# Patient Record
Sex: FEMALE | Race: WHITE | HISPANIC OR LATINO | URBAN - METROPOLITAN AREA
[De-identification: names, ages, dates, MRNs, and addresses within clinical notes are randomized per-mention and may not be internally consistent; named-entity substitution may affect disease eponyms.]

---

## 2017-05-19 ENCOUNTER — EMERGENCY (EMERGENCY)
Facility: HOSPITAL | Age: 37
LOS: 1 days | Discharge: HOME | End: 2017-05-19
Attending: EMERGENCY MEDICINE
Payer: COMMERCIAL

## 2017-05-19 VITALS
DIASTOLIC BLOOD PRESSURE: 66 MMHG | SYSTOLIC BLOOD PRESSURE: 114 MMHG | OXYGEN SATURATION: 99 % | RESPIRATION RATE: 19 BRPM | HEART RATE: 64 BPM | WEIGHT: 199.96 LBS | HEIGHT: 66 IN | TEMPERATURE: 98 F

## 2017-05-19 DIAGNOSIS — M50.222 OTHER CERVICAL DISC DISPLACEMENT AT C5-C6 LEVEL: ICD-10-CM
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
## 2017-05-19 DIAGNOSIS — M48.02 SPINAL STENOSIS, CERVICAL REGION: ICD-10-CM

## 2017-05-19 DIAGNOSIS — M25.512 PAIN IN LEFT SHOULDER: ICD-10-CM

## 2017-05-19 DIAGNOSIS — R55 SYNCOPE AND COLLAPSE: ICD-10-CM
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
## 2017-05-19 DIAGNOSIS — R42 DIZZINESS AND GIDDINESS: ICD-10-CM

## 2017-05-19 DIAGNOSIS — R11.0 NAUSEA: ICD-10-CM

## 2017-05-19 PROCEDURE — 99284 EMERGENCY DEPT VISIT MOD MDM: CPT | Mod: 25

## 2017-05-19 PROCEDURE — 71046 X-RAY EXAM CHEST 2 VIEWS: CPT

## 2017-05-19 PROCEDURE — 72125 CT NECK SPINE W/O DYE: CPT | Mod: 26

## 2017-05-19 PROCEDURE — 99285 EMERGENCY DEPT VISIT HI MDM: CPT | Mod: 25

## 2017-05-19 PROCEDURE — 73030 X-RAY EXAM OF SHOULDER: CPT

## 2017-05-19 PROCEDURE — 93005 ELECTROCARDIOGRAM TRACING: CPT

## 2017-05-19 PROCEDURE — 73030 X-RAY EXAM OF SHOULDER: CPT | Mod: 26,LT

## 2017-05-19 PROCEDURE — 72125 CT NECK SPINE W/O DYE: CPT

## 2017-05-19 PROCEDURE — 71020: CPT | Mod: 26

## 2017-05-19 PROCEDURE — 70450 CT HEAD/BRAIN W/O DYE: CPT | Mod: 26

## 2017-05-19 PROCEDURE — 70450 CT HEAD/BRAIN W/O DYE: CPT

## 2017-05-19 RX ORDER — ACETAMINOPHEN 500 MG
975 TABLET ORAL ONCE
Qty: 0 | Refills: 0 | Status: COMPLETED | OUTPATIENT
Start: 2017-05-19 | End: 2017-05-19

## 2017-05-19 RX ADMIN — Medication 975 MILLIGRAM(S): at 23:58
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
## 2017-05-19 NOTE — ED PROVIDER NOTE - CONDUCTED A DETAILED DISCUSSION WITH PATIENT AND/OR GUARDIAN REGARDING, MDM
return to ED if symptoms worsen, persist or questions arise lab results/radiology results/return to ED if symptoms worsen, persist or questions arise
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
## 2017-05-19 NOTE — ED PROVIDER NOTE - NS ED MD SCRIBE ATTENDING SCRIBE SECTIONS
PHYSICAL EXAM/HIV/REVIEW OF SYSTEMS/DISPOSITION/PAST MEDICAL/SURGICAL/SOCIAL HISTORY/HISTORY OF PRESENT ILLNESS/VITAL SIGNS( Pullset)
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
## 2017-05-20 VITALS
TEMPERATURE: 98 F | HEART RATE: 51 BPM | OXYGEN SATURATION: 100 % | SYSTOLIC BLOOD PRESSURE: 107 MMHG | DIASTOLIC BLOOD PRESSURE: 75 MMHG | RESPIRATION RATE: 18 BRPM

## 2017-06-08 ENCOUNTER — APPOINTMENT (OUTPATIENT)
Dept: SPINE | Facility: CLINIC | Age: 37
End: 2017-06-08

## 2017-06-08 VITALS
SYSTOLIC BLOOD PRESSURE: 122 MMHG | BODY MASS INDEX: 33.11 KG/M2 | DIASTOLIC BLOOD PRESSURE: 76 MMHG | HEIGHT: 66 IN | WEIGHT: 206 LBS

## 2017-06-08 DIAGNOSIS — G95.9 DISEASE OF SPINAL CORD, UNSPECIFIED: ICD-10-CM

## 2017-07-21 ENCOUNTER — OUTPATIENT (OUTPATIENT)
Dept: OUTPATIENT SERVICES | Facility: HOSPITAL | Age: 37
LOS: 1 days | End: 2017-07-21
Payer: COMMERCIAL

## 2017-07-21 VITALS
OXYGEN SATURATION: 99 % | HEIGHT: 66 IN | TEMPERATURE: 98 F | SYSTOLIC BLOOD PRESSURE: 120 MMHG | DIASTOLIC BLOOD PRESSURE: 70 MMHG | HEART RATE: 80 BPM | WEIGHT: 203.05 LBS | RESPIRATION RATE: 16 BRPM

## 2017-07-21 DIAGNOSIS — M50.00 CERVICAL DISC DISORDER WITH MYELOPATHY, UNSPECIFIED CERVICAL REGION: ICD-10-CM

## 2017-07-21 DIAGNOSIS — G95.9 DISEASE OF SPINAL CORD, UNSPECIFIED: ICD-10-CM

## 2017-07-21 DIAGNOSIS — Z98.890 OTHER SPECIFIED POSTPROCEDURAL STATES: Chronic | ICD-10-CM

## 2017-07-21 DIAGNOSIS — Z01.818 ENCOUNTER FOR OTHER PREPROCEDURAL EXAMINATION: ICD-10-CM

## 2017-07-21 LAB
ANION GAP SERPL CALC-SCNC: 17 MMOL/L — SIGNIFICANT CHANGE UP (ref 5–17)
BLD GP AB SCN SERPL QL: NEGATIVE — SIGNIFICANT CHANGE UP
BUN SERPL-MCNC: 8 MG/DL — SIGNIFICANT CHANGE UP (ref 7–23)
CALCIUM SERPL-MCNC: 9.4 MG/DL — SIGNIFICANT CHANGE UP (ref 8.4–10.5)
CHLORIDE SERPL-SCNC: 104 MMOL/L — SIGNIFICANT CHANGE UP (ref 96–108)
CO2 SERPL-SCNC: 22 MMOL/L — SIGNIFICANT CHANGE UP (ref 22–31)
CREAT SERPL-MCNC: 0.82 MG/DL — SIGNIFICANT CHANGE UP (ref 0.5–1.3)
GLUCOSE SERPL-MCNC: 89 MG/DL — SIGNIFICANT CHANGE UP (ref 70–99)
HCT VFR BLD CALC: 42.6 % — SIGNIFICANT CHANGE UP (ref 34.5–45)
HGB BLD-MCNC: 14.1 G/DL — SIGNIFICANT CHANGE UP (ref 11.5–15.5)
MCHC RBC-ENTMCNC: 30.1 PG — SIGNIFICANT CHANGE UP (ref 27–34)
MCHC RBC-ENTMCNC: 33.1 GM/DL — SIGNIFICANT CHANGE UP (ref 32–36)
MCV RBC AUTO: 90.8 FL — SIGNIFICANT CHANGE UP (ref 80–100)
PLATELET # BLD AUTO: 360 K/UL — SIGNIFICANT CHANGE UP (ref 150–400)
POTASSIUM SERPL-MCNC: 3.9 MMOL/L — SIGNIFICANT CHANGE UP (ref 3.5–5.3)
POTASSIUM SERPL-SCNC: 3.9 MMOL/L — SIGNIFICANT CHANGE UP (ref 3.5–5.3)
RBC # BLD: 4.69 M/UL — SIGNIFICANT CHANGE UP (ref 3.8–5.2)
RBC # FLD: 14.3 % — SIGNIFICANT CHANGE UP (ref 10.3–14.5)
RH IG SCN BLD-IMP: POSITIVE — SIGNIFICANT CHANGE UP
SODIUM SERPL-SCNC: 143 MMOL/L — SIGNIFICANT CHANGE UP (ref 135–145)
WBC # BLD: 6.03 K/UL — SIGNIFICANT CHANGE UP (ref 3.8–10.5)
WBC # FLD AUTO: 6.03 K/UL — SIGNIFICANT CHANGE UP (ref 3.8–10.5)

## 2017-07-21 PROCEDURE — 80048 BASIC METABOLIC PNL TOTAL CA: CPT

## 2017-07-21 PROCEDURE — 86900 BLOOD TYPING SEROLOGIC ABO: CPT

## 2017-07-21 PROCEDURE — 86901 BLOOD TYPING SEROLOGIC RH(D): CPT

## 2017-07-21 PROCEDURE — 86850 RBC ANTIBODY SCREEN: CPT

## 2017-07-21 PROCEDURE — G0463: CPT

## 2017-07-21 PROCEDURE — 85027 COMPLETE CBC AUTOMATED: CPT

## 2017-07-21 RX ORDER — CEFAZOLIN SODIUM 1 G
2000 VIAL (EA) INJECTION ONCE
Qty: 0 | Refills: 0 | Status: COMPLETED | OUTPATIENT
Start: 2017-07-28 | End: 2017-07-28

## 2017-07-21 NOTE — H&P PST ADULT - NSANTHOSAYNRD_GEN_A_CORE
No. ANJEL screening performed.  STOP BANG Legend: 0-2 = LOW Risk; 3-4 = INTERMEDIATE Risk; 5-8 = HIGH Risk
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
## 2017-07-21 NOTE — H&P PST ADULT - MUSCULOSKELETAL
negative detailed exam no joint swelling/no joint erythema/normal strength/no joint warmth/no calf tenderness details…

## 2017-07-28 ENCOUNTER — TRANSCRIPTION ENCOUNTER (OUTPATIENT)
Age: 37
End: 2017-07-28

## 2017-07-28 ENCOUNTER — INPATIENT (INPATIENT)
Facility: HOSPITAL | Age: 37
LOS: 1 days | Discharge: HOME | DRG: 472 | End: 2017-07-30
Attending: NEUROLOGICAL SURGERY | Admitting: NEUROLOGICAL SURGERY
Payer: COMMERCIAL

## 2017-07-28 ENCOUNTER — APPOINTMENT (OUTPATIENT)
Dept: SPINE | Facility: HOSPITAL | Age: 37
End: 2017-07-28

## 2017-07-28 VITALS
SYSTOLIC BLOOD PRESSURE: 115 MMHG | HEIGHT: 66 IN | WEIGHT: 203.05 LBS | OXYGEN SATURATION: 98 % | TEMPERATURE: 98 F | RESPIRATION RATE: 18 BRPM | DIASTOLIC BLOOD PRESSURE: 84 MMHG | HEART RATE: 71 BPM

## 2017-07-28 DIAGNOSIS — Z01.818 ENCOUNTER FOR OTHER PREPROCEDURAL EXAMINATION: ICD-10-CM

## 2017-07-28 DIAGNOSIS — G95.9 DISEASE OF SPINAL CORD, UNSPECIFIED: ICD-10-CM

## 2017-07-28 DIAGNOSIS — Z98.890 OTHER SPECIFIED POSTPROCEDURAL STATES: Chronic | ICD-10-CM

## 2017-07-28 LAB
HCG UR QL: NEGATIVE — SIGNIFICANT CHANGE UP
RH IG SCN BLD-IMP: POSITIVE — SIGNIFICANT CHANGE UP

## 2017-07-28 PROCEDURE — 22554 ARTHRD ANT NTRBD MIN DSC CRV: CPT | Mod: GC

## 2017-07-28 PROCEDURE — 22585 ARTHRD ANT NTRBD MIN DSC EA: CPT | Mod: GC

## 2017-07-28 PROCEDURE — 72141 MRI NECK SPINE W/O DYE: CPT | Mod: 26

## 2017-07-28 PROCEDURE — 22845 INSERT SPINE FIXATION DEVICE: CPT | Mod: 59,GC

## 2017-07-28 PROCEDURE — 22854 INSJ BIOMECHANICAL DEVICE: CPT | Mod: GC

## 2017-07-28 PROCEDURE — 63081 REMOVE VERT BODY DCMPRN CRVL: CPT | Mod: GC

## 2017-07-28 RX ORDER — ACETAMINOPHEN 500 MG
650 TABLET ORAL EVERY 6 HOURS
Qty: 0 | Refills: 0 | Status: DISCONTINUED | OUTPATIENT
Start: 2017-07-28 | End: 2017-07-30

## 2017-07-28 RX ORDER — BENZOCAINE AND MENTHOL 5; 1 G/100ML; G/100ML
1 LIQUID ORAL EVERY 4 HOURS
Qty: 0 | Refills: 0 | Status: COMPLETED | OUTPATIENT
Start: 2017-07-28 | End: 2017-07-29

## 2017-07-28 RX ORDER — HYDROMORPHONE HYDROCHLORIDE 2 MG/ML
0.5 INJECTION INTRAMUSCULAR; INTRAVENOUS; SUBCUTANEOUS
Qty: 0 | Refills: 0 | Status: DISCONTINUED | OUTPATIENT
Start: 2017-07-28 | End: 2017-07-28

## 2017-07-28 RX ORDER — MORPHINE SULFATE 50 MG/1
2 CAPSULE, EXTENDED RELEASE ORAL
Qty: 0 | Refills: 0 | Status: DISCONTINUED | OUTPATIENT
Start: 2017-07-28 | End: 2017-07-28

## 2017-07-28 RX ORDER — OXYCODONE AND ACETAMINOPHEN 5; 325 MG/1; MG/1
1 TABLET ORAL EVERY 4 HOURS
Qty: 0 | Refills: 0 | Status: DISCONTINUED | OUTPATIENT
Start: 2017-07-28 | End: 2017-07-30

## 2017-07-28 RX ORDER — SODIUM CHLORIDE 9 MG/ML
3 INJECTION INTRAMUSCULAR; INTRAVENOUS; SUBCUTANEOUS EVERY 8 HOURS
Qty: 0 | Refills: 0 | Status: DISCONTINUED | OUTPATIENT
Start: 2017-07-28 | End: 2017-07-28

## 2017-07-28 RX ORDER — DOCUSATE SODIUM 100 MG
100 CAPSULE ORAL THREE TIMES A DAY
Qty: 0 | Refills: 0 | Status: DISCONTINUED | OUTPATIENT
Start: 2017-07-28 | End: 2017-07-30

## 2017-07-28 RX ORDER — DIAZEPAM 5 MG
5 TABLET ORAL EVERY 8 HOURS
Qty: 0 | Refills: 0 | Status: DISCONTINUED | OUTPATIENT
Start: 2017-07-28 | End: 2017-07-30

## 2017-07-28 RX ORDER — DEXAMETHASONE 0.5 MG/5ML
4 ELIXIR ORAL EVERY 8 HOURS
Qty: 0 | Refills: 0 | Status: DISCONTINUED | OUTPATIENT
Start: 2017-07-28 | End: 2017-07-30

## 2017-07-28 RX ORDER — SENNA PLUS 8.6 MG/1
2 TABLET ORAL AT BEDTIME
Qty: 0 | Refills: 0 | Status: DISCONTINUED | OUTPATIENT
Start: 2017-07-28 | End: 2017-07-30

## 2017-07-28 RX ORDER — OXYCODONE AND ACETAMINOPHEN 5; 325 MG/1; MG/1
2 TABLET ORAL EVERY 6 HOURS
Qty: 0 | Refills: 0 | Status: DISCONTINUED | OUTPATIENT
Start: 2017-07-28 | End: 2017-07-30

## 2017-07-28 RX ORDER — CEFAZOLIN SODIUM 1 G
2000 VIAL (EA) INJECTION EVERY 8 HOURS
Qty: 0 | Refills: 0 | Status: COMPLETED | OUTPATIENT
Start: 2017-07-28 | End: 2017-07-29

## 2017-07-28 RX ORDER — ENOXAPARIN SODIUM 100 MG/ML
40 INJECTION SUBCUTANEOUS AT BEDTIME
Qty: 0 | Refills: 0 | Status: DISCONTINUED | OUTPATIENT
Start: 2017-07-29 | End: 2017-07-30

## 2017-07-28 RX ORDER — DEXTROSE MONOHYDRATE, SODIUM CHLORIDE, AND POTASSIUM CHLORIDE 50; .745; 4.5 G/1000ML; G/1000ML; G/1000ML
1000 INJECTION, SOLUTION INTRAVENOUS
Qty: 0 | Refills: 0 | Status: DISCONTINUED | OUTPATIENT
Start: 2017-07-28 | End: 2017-07-30

## 2017-07-28 RX ORDER — GABAPENTIN 400 MG/1
300 CAPSULE ORAL THREE TIMES A DAY
Qty: 0 | Refills: 0 | Status: DISCONTINUED | OUTPATIENT
Start: 2017-07-28 | End: 2017-07-30

## 2017-07-28 RX ORDER — ACETAMINOPHEN 500 MG
1000 TABLET ORAL ONCE
Qty: 0 | Refills: 0 | Status: COMPLETED | OUTPATIENT
Start: 2017-07-28 | End: 2017-07-28

## 2017-07-28 RX ORDER — LIDOCAINE HCL 20 MG/ML
0.2 VIAL (ML) INJECTION ONCE
Qty: 0 | Refills: 0 | Status: DISCONTINUED | OUTPATIENT
Start: 2017-07-28 | End: 2017-07-28

## 2017-07-28 RX ADMIN — OXYCODONE AND ACETAMINOPHEN 1 TABLET(S): 5; 325 TABLET ORAL at 20:00

## 2017-07-28 RX ADMIN — HYDROMORPHONE HYDROCHLORIDE 0.5 MILLIGRAM(S): 2 INJECTION INTRAMUSCULAR; INTRAVENOUS; SUBCUTANEOUS at 16:15

## 2017-07-28 RX ADMIN — DEXTROSE MONOHYDRATE, SODIUM CHLORIDE, AND POTASSIUM CHLORIDE 75 MILLILITER(S): 50; .745; 4.5 INJECTION, SOLUTION INTRAVENOUS at 11:16

## 2017-07-28 RX ADMIN — MORPHINE SULFATE 2 MILLIGRAM(S): 50 CAPSULE, EXTENDED RELEASE ORAL at 21:48

## 2017-07-28 RX ADMIN — Medication 400 MILLIGRAM(S): at 23:15

## 2017-07-28 RX ADMIN — Medication 5 MILLIGRAM(S): at 17:26

## 2017-07-28 RX ADMIN — HYDROMORPHONE HYDROCHLORIDE 0.5 MILLIGRAM(S): 2 INJECTION INTRAMUSCULAR; INTRAVENOUS; SUBCUTANEOUS at 11:45

## 2017-07-28 RX ADMIN — BENZOCAINE AND MENTHOL 1 LOZENGE: 5; 1 LIQUID ORAL at 17:00

## 2017-07-28 RX ADMIN — HYDROMORPHONE HYDROCHLORIDE 0.5 MILLIGRAM(S): 2 INJECTION INTRAMUSCULAR; INTRAVENOUS; SUBCUTANEOUS at 11:30

## 2017-07-28 RX ADMIN — Medication 100 MILLIGRAM(S): at 08:14

## 2017-07-28 RX ADMIN — SODIUM CHLORIDE 3 MILLILITER(S): 9 INJECTION INTRAMUSCULAR; INTRAVENOUS; SUBCUTANEOUS at 06:13

## 2017-07-28 RX ADMIN — Medication 4 MILLIGRAM(S): at 17:22

## 2017-07-28 RX ADMIN — MORPHINE SULFATE 2 MILLIGRAM(S): 50 CAPSULE, EXTENDED RELEASE ORAL at 22:15

## 2017-07-28 RX ADMIN — Medication 1000 MILLIGRAM(S): at 23:45

## 2017-07-28 RX ADMIN — Medication 100 MILLIGRAM(S): at 18:30

## 2017-07-28 RX ADMIN — HYDROMORPHONE HYDROCHLORIDE 0.5 MILLIGRAM(S): 2 INJECTION INTRAMUSCULAR; INTRAVENOUS; SUBCUTANEOUS at 16:00

## 2017-07-28 RX ADMIN — BENZOCAINE AND MENTHOL 1 LOZENGE: 5; 1 LIQUID ORAL at 20:41

## 2017-07-28 RX ADMIN — OXYCODONE AND ACETAMINOPHEN 1 TABLET(S): 5; 325 TABLET ORAL at 19:30

## 2017-07-28 NOTE — BRIEF OPERATIVE NOTE - PROCEDURE
Corpectomy of cervical spine at one level  07/28/2017  C5 corpectomy; C4-6 anterior intrumented fusion  Active  NMEHAN

## 2017-07-28 NOTE — PHYSICAL THERAPY INITIAL EVALUATION ADULT - GENERAL OBSERVATIONS, REHAB EVAL
Pt encountered supine in bed, + Cervical collar, + A line, + PIV, + Cardiac monitor, + venodynes AO x3
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
## 2017-07-28 NOTE — PHYSICAL THERAPY INITIAL EVALUATION ADULT - DID THE PATIENT HAVE SURGERY?
Corpectomy of cervical spine at one level  07/28/2017  C5 corpectomy; C4-6 anterior intrumented fusion/yes

## 2017-07-28 NOTE — PHYSICAL THERAPY INITIAL EVALUATION ADULT - CRITERIA FOR SKILLED THERAPEUTIC INTERVENTIONS
functional limitations in following categories/predicted duration of therapy intervention/therapy frequency/anticipated discharge recommendation/anticipated equipment needs at discharge/risk reduction/prevention/impairments found/rehab potential
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
## 2017-07-29 LAB
ANION GAP SERPL CALC-SCNC: 16 MMOL/L — SIGNIFICANT CHANGE UP (ref 5–17)
BASOPHILS # BLD AUTO: 0.01 K/UL — SIGNIFICANT CHANGE UP (ref 0–0.2)
BASOPHILS NFR BLD AUTO: 0.1 % — SIGNIFICANT CHANGE UP (ref 0–2)
BUN SERPL-MCNC: 6 MG/DL — LOW (ref 7–23)
CALCIUM SERPL-MCNC: 9.4 MG/DL — SIGNIFICANT CHANGE UP (ref 8.4–10.5)
CHLORIDE SERPL-SCNC: 108 MMOL/L — SIGNIFICANT CHANGE UP (ref 96–108)
CO2 SERPL-SCNC: 18 MMOL/L — LOW (ref 22–31)
CREAT SERPL-MCNC: 0.66 MG/DL — SIGNIFICANT CHANGE UP (ref 0.5–1.3)
EOSINOPHIL # BLD AUTO: 0 K/UL — SIGNIFICANT CHANGE UP (ref 0–0.5)
EOSINOPHIL NFR BLD AUTO: 0 % — SIGNIFICANT CHANGE UP (ref 0–6)
GLUCOSE SERPL-MCNC: 121 MG/DL — HIGH (ref 70–99)
HCT VFR BLD CALC: 39.2 % — SIGNIFICANT CHANGE UP (ref 34.5–45)
HGB BLD-MCNC: 12.9 G/DL — SIGNIFICANT CHANGE UP (ref 11.5–15.5)
IMM GRANULOCYTES NFR BLD AUTO: 0.3 % — SIGNIFICANT CHANGE UP (ref 0–1.5)
LYMPHOCYTES # BLD AUTO: 1.23 K/UL — SIGNIFICANT CHANGE UP (ref 1–3.3)
LYMPHOCYTES # BLD AUTO: 11.9 % — LOW (ref 13–44)
MCHC RBC-ENTMCNC: 29.3 PG — SIGNIFICANT CHANGE UP (ref 27–34)
MCHC RBC-ENTMCNC: 32.9 GM/DL — SIGNIFICANT CHANGE UP (ref 32–36)
MCV RBC AUTO: 89.1 FL — SIGNIFICANT CHANGE UP (ref 80–100)
MONOCYTES # BLD AUTO: 0.41 K/UL — SIGNIFICANT CHANGE UP (ref 0–0.9)
MONOCYTES NFR BLD AUTO: 4 % — SIGNIFICANT CHANGE UP (ref 2–14)
NEUTROPHILS # BLD AUTO: 8.66 K/UL — HIGH (ref 1.8–7.4)
NEUTROPHILS NFR BLD AUTO: 83.7 % — HIGH (ref 43–77)
PLATELET # BLD AUTO: 317 K/UL — SIGNIFICANT CHANGE UP (ref 150–400)
POTASSIUM SERPL-MCNC: 4.6 MMOL/L — SIGNIFICANT CHANGE UP (ref 3.5–5.3)
POTASSIUM SERPL-SCNC: 4.6 MMOL/L — SIGNIFICANT CHANGE UP (ref 3.5–5.3)
RBC # BLD: 4.4 M/UL — SIGNIFICANT CHANGE UP (ref 3.8–5.2)
RBC # FLD: 14.1 % — SIGNIFICANT CHANGE UP (ref 10.3–14.5)
SODIUM SERPL-SCNC: 142 MMOL/L — SIGNIFICANT CHANGE UP (ref 135–145)
WBC # BLD: 10.34 K/UL — SIGNIFICANT CHANGE UP (ref 3.8–10.5)
WBC # FLD AUTO: 10.34 K/UL — SIGNIFICANT CHANGE UP (ref 3.8–10.5)

## 2017-07-29 RX ORDER — BENZOCAINE AND MENTHOL 5; 1 G/100ML; G/100ML
1 LIQUID ORAL EVERY 4 HOURS
Qty: 0 | Refills: 0 | Status: DISCONTINUED | OUTPATIENT
Start: 2017-07-29 | End: 2017-07-30

## 2017-07-29 RX ORDER — MORPHINE SULFATE 50 MG/1
2 CAPSULE, EXTENDED RELEASE ORAL ONCE
Qty: 0 | Refills: 0 | Status: DISCONTINUED | OUTPATIENT
Start: 2017-07-29 | End: 2017-07-29

## 2017-07-29 RX ORDER — IPRATROPIUM/ALBUTEROL SULFATE 18-103MCG
3 AEROSOL WITH ADAPTER (GRAM) INHALATION EVERY 6 HOURS
Qty: 0 | Refills: 0 | Status: DISCONTINUED | OUTPATIENT
Start: 2017-07-29 | End: 2017-07-30

## 2017-07-29 RX ORDER — ALBUTEROL 90 UG/1
1 AEROSOL, METERED ORAL EVERY 4 HOURS
Qty: 0 | Refills: 0 | Status: DISCONTINUED | OUTPATIENT
Start: 2017-07-29 | End: 2017-07-30

## 2017-07-29 RX ORDER — ACETAMINOPHEN 500 MG
1000 TABLET ORAL ONCE
Qty: 0 | Refills: 0 | Status: COMPLETED | OUTPATIENT
Start: 2017-07-29 | End: 2017-07-29

## 2017-07-29 RX ORDER — TIOTROPIUM BROMIDE 18 UG/1
1 CAPSULE ORAL; RESPIRATORY (INHALATION) DAILY
Qty: 0 | Refills: 0 | Status: DISCONTINUED | OUTPATIENT
Start: 2017-07-29 | End: 2017-07-30

## 2017-07-29 RX ADMIN — Medication 3 MILLILITER(S): at 20:46

## 2017-07-29 RX ADMIN — SENNA PLUS 2 TABLET(S): 8.6 TABLET ORAL at 21:46

## 2017-07-29 RX ADMIN — Medication 400 MILLIGRAM(S): at 21:45

## 2017-07-29 RX ADMIN — Medication 400 MILLIGRAM(S): at 11:09

## 2017-07-29 RX ADMIN — Medication 4 MILLIGRAM(S): at 16:19

## 2017-07-29 RX ADMIN — MORPHINE SULFATE 2 MILLIGRAM(S): 50 CAPSULE, EXTENDED RELEASE ORAL at 17:02

## 2017-07-29 RX ADMIN — Medication 5 MILLIGRAM(S): at 02:21

## 2017-07-29 RX ADMIN — Medication 1000 MILLIGRAM(S): at 22:00

## 2017-07-29 RX ADMIN — GABAPENTIN 300 MILLIGRAM(S): 400 CAPSULE ORAL at 02:23

## 2017-07-29 RX ADMIN — Medication 4 MILLIGRAM(S): at 02:12

## 2017-07-29 RX ADMIN — Medication 4 MILLIGRAM(S): at 08:34

## 2017-07-29 RX ADMIN — GABAPENTIN 300 MILLIGRAM(S): 400 CAPSULE ORAL at 16:18

## 2017-07-29 RX ADMIN — Medication 5 MILLIGRAM(S): at 14:28

## 2017-07-29 RX ADMIN — BENZOCAINE AND MENTHOL 1 LOZENGE: 5; 1 LIQUID ORAL at 08:34

## 2017-07-29 RX ADMIN — BENZOCAINE AND MENTHOL 1 LOZENGE: 5; 1 LIQUID ORAL at 20:43

## 2017-07-29 RX ADMIN — Medication 100 MILLIGRAM(S): at 02:15

## 2017-07-29 RX ADMIN — Medication 1000 MILLIGRAM(S): at 15:33

## 2017-07-29 RX ADMIN — BENZOCAINE AND MENTHOL 1 LOZENGE: 5; 1 LIQUID ORAL at 16:22

## 2017-07-29 RX ADMIN — Medication 100 MILLIGRAM(S): at 21:46

## 2017-07-29 RX ADMIN — ENOXAPARIN SODIUM 40 MILLIGRAM(S): 100 INJECTION SUBCUTANEOUS at 21:46

## 2017-07-29 RX ADMIN — MORPHINE SULFATE 2 MILLIGRAM(S): 50 CAPSULE, EXTENDED RELEASE ORAL at 17:57

## 2017-07-29 RX ADMIN — GABAPENTIN 300 MILLIGRAM(S): 400 CAPSULE ORAL at 08:34

## 2017-07-29 NOTE — PROGRESS NOTE ADULT - PROBLEM SELECTOR PLAN 1
q4 neuro checks  pain control  d/c hansen  continue PT/OT  stephy WILKINSON at all times  d/c home w/ medrol dose pack when pain is well controlled

## 2017-07-29 NOTE — PROGRESS NOTE ADULT - SUBJECTIVE AND OBJECTIVE BOX
Visit Summary: Patient seen and evaluated at bedside POD# s/p C5 corpectomy w/ C4-6 fusion. MRI yesterday demonstrates decompression of spinal cord w/ no signal change. Patient is moving her L side w/ greater strength than she was yesterday, and her pain is improved. Per PT, she is cleared for home w/ home PT.    Exam:  T(C): 36.6 (07-29-17 @ 09:20), Max: 37.4 (07-28-17 @ 21:50)  HR: 63 (07-29-17 @ 11:22) (8 - 91)  BP: 123/78 (07-29-17 @ 09:20) (107/66 - 159/72)  RR: 16 (07-29-17 @ 09:20) (13 - 22)  SpO2: 94% (07-29-17 @ 09:20) (94% - 100%)  Wt(kg): --    PHYSICAL EXAM:    Constitutional: No Acute Distress     Neurological: AOx3, Following Commands, Moving all Extremities     Motor exam:          Upper extremity                         Delt     Bicep     Tricep    HG                                                 R         5/5        5/5        5/5       5/5                                               L          5/5        5/5        5/5       5/5          Lower extremity                        HF         KF        KE       DF         PF                                                  R        5/5        5/5        5/5       5/5         5/5                                               L         4-/5        4+/5       4+/5       5/5          5/5                                                 Sensation: [x] intact to light touch  [] decreased:     Pulmonary: Clear to Auscultation, No rales, No rhonchi, No wheezes     Cardiovascular: S1, S2, Regular rate and rhythm     Gastrointestinal: Soft, Non-tender, Non-distended     Extremities: No calf tenderness     Incision: c/d/i                          12.9   10.34 )-----------( 317      ( 29 Jul 2017 08:45 )             39.2     07-29    142  |  108  |  6<L>  ----------------------------<  121<H>  4.6   |  18<L>  |  0.66    Ca    9.4      29 Jul 2017 08:56

## 2017-07-29 NOTE — PROGRESS NOTE ADULT - ASSESSMENT
37F POD# s/p C5 corpectomy w/ C4-6 fusion. Her postop weakness has improved, although there is still some asymmetry in her HF movement (L weaker than R). Patient has worked successfully w/ PT and will likely continue to improve given negative MRI findings.

## 2017-07-30 ENCOUNTER — TRANSCRIPTION ENCOUNTER (OUTPATIENT)
Age: 37
End: 2017-07-30

## 2017-07-30 VITALS
SYSTOLIC BLOOD PRESSURE: 112 MMHG | OXYGEN SATURATION: 97 % | HEART RATE: 56 BPM | RESPIRATION RATE: 16 BRPM | TEMPERATURE: 37 F | DIASTOLIC BLOOD PRESSURE: 71 MMHG

## 2017-07-30 PROCEDURE — 81025 URINE PREGNANCY TEST: CPT

## 2017-07-30 PROCEDURE — 72141 MRI NECK SPINE W/O DYE: CPT

## 2017-07-30 PROCEDURE — C1713: CPT

## 2017-07-30 PROCEDURE — 85027 COMPLETE CBC AUTOMATED: CPT

## 2017-07-30 PROCEDURE — 97116 GAIT TRAINING THERAPY: CPT

## 2017-07-30 PROCEDURE — 80048 BASIC METABOLIC PNL TOTAL CA: CPT

## 2017-07-30 PROCEDURE — 86900 BLOOD TYPING SEROLOGIC ABO: CPT

## 2017-07-30 PROCEDURE — C1769: CPT

## 2017-07-30 PROCEDURE — C1889: CPT

## 2017-07-30 PROCEDURE — 86901 BLOOD TYPING SEROLOGIC RH(D): CPT

## 2017-07-30 PROCEDURE — 97110 THERAPEUTIC EXERCISES: CPT

## 2017-07-30 PROCEDURE — 94640 AIRWAY INHALATION TREATMENT: CPT

## 2017-07-30 PROCEDURE — 97161 PT EVAL LOW COMPLEX 20 MIN: CPT

## 2017-07-30 PROCEDURE — C1821: CPT

## 2017-07-30 PROCEDURE — 76000 FLUOROSCOPY <1 HR PHYS/QHP: CPT

## 2017-07-30 RX ORDER — DOCUSATE SODIUM 100 MG
1 CAPSULE ORAL
Qty: 0 | Refills: 0 | DISCHARGE
Start: 2017-07-30

## 2017-07-30 RX ORDER — GABAPENTIN 400 MG/1
1 CAPSULE ORAL
Qty: 0 | Refills: 0 | COMMUNITY

## 2017-07-30 RX ORDER — TIOTROPIUM BROMIDE 18 UG/1
1 CAPSULE ORAL; RESPIRATORY (INHALATION)
Qty: 1 | Refills: 0 | OUTPATIENT
Start: 2017-07-30

## 2017-07-30 RX ORDER — GABAPENTIN 400 MG/1
1 CAPSULE ORAL
Qty: 21 | Refills: 0
Start: 2017-07-30 | End: 2017-08-06

## 2017-07-30 RX ORDER — ACETAMINOPHEN 500 MG
2 TABLET ORAL
Qty: 0 | Refills: 0 | DISCHARGE
Start: 2017-07-30

## 2017-07-30 RX ORDER — IPRATROPIUM/ALBUTEROL SULFATE 18-103MCG
3 AEROSOL WITH ADAPTER (GRAM) INHALATION
Qty: 1 | Refills: 0 | OUTPATIENT
Start: 2017-07-30

## 2017-07-30 RX ORDER — ALBUTEROL 90 UG/1
1 AEROSOL, METERED ORAL
Qty: 1 | Refills: 0
Start: 2017-07-30 | End: 2017-08-06

## 2017-07-30 RX ORDER — SENNA PLUS 8.6 MG/1
2 TABLET ORAL
Qty: 0 | Refills: 0 | DISCHARGE
Start: 2017-07-30

## 2017-07-30 RX ORDER — DIAZEPAM 5 MG
1 TABLET ORAL
Qty: 15 | Refills: 0
Start: 2017-07-30 | End: 2017-08-04

## 2017-07-30 RX ADMIN — Medication 4 MILLIGRAM(S): at 10:24

## 2017-07-30 RX ADMIN — GABAPENTIN 300 MILLIGRAM(S): 400 CAPSULE ORAL at 10:40

## 2017-07-30 RX ADMIN — BENZOCAINE AND MENTHOL 1 LOZENGE: 5; 1 LIQUID ORAL at 07:49

## 2017-07-30 RX ADMIN — Medication 4 MILLIGRAM(S): at 02:16

## 2017-07-30 RX ADMIN — Medication 5 MILLIGRAM(S): at 10:45

## 2017-07-30 RX ADMIN — Medication 5 MILLIGRAM(S): at 02:33

## 2017-07-30 RX ADMIN — Medication 3 MILLILITER(S): at 07:49

## 2017-07-30 NOTE — DISCHARGE NOTE ADULT - ADDITIONAL INSTRUCTIONS
No strenous activity. No heavy lifting. Do not return to work until cleared by physician. No driving until cleared by physician.  You may shower on the 3rd day after you surgery.  No soaking in tub,  Donot remove steri strips. Donot apply any ointements to incision.

## 2017-07-30 NOTE — DISCHARGE NOTE ADULT - MEDICATION SUMMARY - MEDICATIONS TO TAKE
I will START or STAY ON the medications listed below when I get home from the hospital:    MethylPREDNISolone Dose Pack 4 mg oral tablet  -- 4 milligram(s) by mouth every 6 hours, taper as instructed  -- It is very important that you take or use this exactly as directed.  Do not skip doses or discontinue unless directed by your doctor.  Obtain medical advice before taking any non-prescription drugs as some may affect the action of this medication.  Take with food or milk.    -- Indication: For s/p C5 corpectomy/fusion    acetaminophen 325 mg oral tablet  -- 2 tab(s) by mouth every 6 hours, As needed, For Temp greater than 38 C (100.4 F)  -- Indication: For Disease of spinal cord    acetaminophen 325 mg oral tablet  -- 2 tab(s) by mouth every 6 hours, As needed, Mild Pain (1 - 3)  -- Indication: For Disease of spinal cord    Percocet 5/325 oral tablet  -- 1 tab(s) by mouth every 4 hours, As needed, Moderate Pain MDD:6 tabs  -- Indication: For Disease of spinal cord    traMADol 50 mg oral tablet  -- 1 tab(s) by mouth 1 to 4 times a day, As Needed pain  -- Indication: For Disease of spinal cord    gabapentin 300 mg oral capsule  -- 1 cap(s) by mouth 3 times a day, As Needed pain  -- Indication: For Disease of spinal cord    diazePAM 5 mg oral tablet  -- 1 tab(s) by mouth every 8 hours, As needed, Muscle spasms MDD:4 tabs  -- Indication: For muscle spasm     albuterol 90 mcg/inh inhalation aerosol  -- 1 puff(s) inhaled every 4 hours  -- Indication: For bronchospasm    albuterol-ipratropium 2.5 mg-0.5 mg/3 mL inhalation solution  -- 3 milliliter(s) inhaled every 6 hours  -- Indication: For broncospasm    tiotropium 18 mcg inhalation capsule  -- 1 cap(s) inhaled once a day  -- Indication: For broncospasm    senna oral tablet  -- 2 tab(s) by mouth once a day (at bedtime)  -- Indication: For constipation    docusate sodium 100 mg oral capsule  -- 1 cap(s) by mouth 3 times a day  -- Indication: For constipation

## 2017-07-30 NOTE — DISCHARGE NOTE ADULT - CARE PROVIDER_API CALL
Celestino Gomes (MD), Neurological Surgery  [address] [place] [id number]  Phone: [phone]  Fax: [phone]

## 2017-07-30 NOTE — DISCHARGE NOTE ADULT - PATIENT PORTAL LINK FT
“You can access the FollowHealth Patient Portal, offered by [facility], by registering with the following website: [email or web address]”

## 2017-07-30 NOTE — DISCHARGE NOTE ADULT - CARE PLAN
Principal Discharge DX:	Cervical disc disease with myelopathy  Goal:	Improvement in pain and function  Instructions for follow-up, activity and diet:	Call Neurosurgery Dr Gomes for appointment in 1 week for wound check and staple removal.  Followup with your Private MD in 1-2 weeks.  Return to Emergency Department or contact your Neurosurgeon if any changes in mental status, weakness, numbness or tingling of extremities; difficulty swallowing; drainage or redness of wound, fever; pain in legs; difficulty urinating or constipation.  Do not restart your Aspirin or take any Motrin/NSAIDS until checking with your Neurosurgeon.
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
## 2017-07-30 NOTE — DISCHARGE NOTE ADULT - PLAN OF CARE
Improvement in pain and function Call Neurosurgery Dr Gomes for appointment in 1 week for wound check and staple removal.  Followup with your Private MD in 1-2 weeks.  Return to Emergency Department or contact your Neurosurgeon if any changes in mental status, weakness, numbness or tingling of extremities; difficulty swallowing; drainage or redness of wound, fever; pain in legs; difficulty urinating or constipation.  Do not restart your Aspirin or take any Motrin/NSAIDS until checking with your Neurosurgeon.

## 2017-08-07 ENCOUNTER — RX RENEWAL (OUTPATIENT)
Age: 37
End: 2017-08-07

## 2017-08-08 NOTE — ED PROVIDER NOTE - SKIN, MLM
Skin normal color for race, warm, dry and intact. No evidence of rash.
continue medications as instructed. Asked the patient to take the Blood pressure medication/ heart medication on DOP.

## 2017-08-14 ENCOUNTER — APPOINTMENT (OUTPATIENT)
Dept: SPINE | Facility: CLINIC | Age: 37
End: 2017-08-14
Payer: COMMERCIAL

## 2017-08-14 VITALS
WEIGHT: 200 LBS | DIASTOLIC BLOOD PRESSURE: 81 MMHG | HEIGHT: 66 IN | SYSTOLIC BLOOD PRESSURE: 117 MMHG | BODY MASS INDEX: 32.14 KG/M2 | HEART RATE: 53 BPM

## 2017-08-14 PROCEDURE — 99024 POSTOP FOLLOW-UP VISIT: CPT

## 2017-08-23 ENCOUNTER — CHART COPY (OUTPATIENT)
Age: 37
End: 2017-08-23

## 2017-08-29 ENCOUNTER — CHART COPY (OUTPATIENT)
Age: 37
End: 2017-08-29

## 2017-09-11 ENCOUNTER — APPOINTMENT (OUTPATIENT)
Dept: SPINE | Facility: CLINIC | Age: 37
End: 2017-09-11
Payer: COMMERCIAL

## 2017-09-11 VITALS
DIASTOLIC BLOOD PRESSURE: 77 MMHG | HEART RATE: 82 BPM | HEIGHT: 64.5 IN | WEIGHT: 197 LBS | SYSTOLIC BLOOD PRESSURE: 113 MMHG | BODY MASS INDEX: 33.22 KG/M2

## 2017-09-11 DIAGNOSIS — M54.5 LOW BACK PAIN: ICD-10-CM

## 2017-09-11 PROCEDURE — 99024 POSTOP FOLLOW-UP VISIT: CPT

## 2017-09-11 RX ORDER — TIOTROPIUM BROMIDE 18 UG/1
18 CAPSULE ORAL; RESPIRATORY (INHALATION)
Qty: 30 | Refills: 0 | Status: COMPLETED | COMMUNITY
Start: 2017-07-30 | End: 2017-09-11

## 2017-09-11 RX ORDER — TRAMADOL HYDROCHLORIDE 25 MG/1
TABLET, COATED ORAL
Refills: 0 | Status: COMPLETED | COMMUNITY
End: 2017-09-11

## 2017-09-11 RX ORDER — MECLIZINE HYDROCHLORIDE 25 MG/1
TABLET ORAL
Refills: 0 | Status: COMPLETED | COMMUNITY
End: 2017-09-11

## 2017-09-11 RX ORDER — METHYLPREDNISOLONE 4 MG/1
4 TABLET ORAL
Qty: 21 | Refills: 0 | Status: COMPLETED | COMMUNITY
Start: 2017-07-30 | End: 2017-09-11

## 2017-09-11 RX ORDER — TRAMADOL HYDROCHLORIDE AND ACETAMINOPHEN 37.5; 325 MG/1; MG/1
37.5-325 TABLET, FILM COATED ORAL
Qty: 60 | Refills: 0 | Status: COMPLETED | COMMUNITY
Start: 2017-06-26 | End: 2017-09-11

## 2017-09-11 RX ORDER — NAPROXEN 500 MG/1
TABLET ORAL
Refills: 0 | Status: COMPLETED | COMMUNITY
End: 2017-09-11

## 2017-09-11 RX ORDER — DIAZEPAM 5 MG/1
5 TABLET ORAL
Qty: 14 | Refills: 0 | Status: COMPLETED | COMMUNITY
Start: 2017-08-07 | End: 2017-09-11

## 2017-09-11 RX ORDER — IPRATROPIUM BROMIDE AND ALBUTEROL SULFATE 2.5; .5 MG/3ML; MG/3ML
0.5-2.5 (3) SOLUTION RESPIRATORY (INHALATION)
Qty: 90 | Refills: 0 | Status: COMPLETED | COMMUNITY
Start: 2017-07-30 | End: 2017-09-11

## 2017-09-11 RX ORDER — GABAPENTIN 600 MG/1
600 TABLET, COATED ORAL EVERY 8 HOURS
Qty: 63 | Refills: 0 | Status: COMPLETED | COMMUNITY
Start: 2017-08-14 | End: 2017-09-11

## 2017-09-11 RX ORDER — OXYCODONE AND ACETAMINOPHEN 5; 325 MG/1; MG/1
5-325 TABLET ORAL EVERY 8 HOURS
Qty: 21 | Refills: 0 | Status: COMPLETED | COMMUNITY
Start: 2017-08-07 | End: 2017-09-11

## 2017-09-11 RX ORDER — ALBUTEROL SULFATE 90 UG/1
108 (90 BASE) AEROSOL, METERED RESPIRATORY (INHALATION)
Qty: 8 | Refills: 0 | Status: COMPLETED | COMMUNITY
Start: 2017-07-30 | End: 2017-09-11

## 2017-09-11 RX ORDER — NAPROXEN 500 MG/1
500 TABLET, DELAYED RELEASE ORAL
Qty: 60 | Refills: 0 | Status: COMPLETED | COMMUNITY
Start: 2017-05-24 | End: 2017-09-11

## 2017-09-11 RX ORDER — FLURAZEPAM HYDROCHLORIDE 15 MG/1
15 CAPSULE ORAL
Qty: 60 | Refills: 0 | Status: COMPLETED | COMMUNITY
Start: 2017-06-13 | End: 2017-09-11

## 2017-09-15 ENCOUNTER — RX RENEWAL (OUTPATIENT)
Age: 37
End: 2017-09-15

## 2017-10-30 ENCOUNTER — APPOINTMENT (OUTPATIENT)
Dept: SPINE | Facility: CLINIC | Age: 37
End: 2017-10-30
Payer: COMMERCIAL

## 2017-10-30 VITALS
HEIGHT: 64.5 IN | DIASTOLIC BLOOD PRESSURE: 75 MMHG | HEART RATE: 55 BPM | WEIGHT: 200 LBS | BODY MASS INDEX: 33.73 KG/M2 | SYSTOLIC BLOOD PRESSURE: 115 MMHG

## 2017-10-30 PROCEDURE — 99213 OFFICE O/P EST LOW 20 MIN: CPT

## 2017-12-06 ENCOUNTER — CHART COPY (OUTPATIENT)
Age: 37
End: 2017-12-06

## 2017-12-11 ENCOUNTER — APPOINTMENT (OUTPATIENT)
Dept: SPINE | Facility: CLINIC | Age: 37
End: 2017-12-11
Payer: COMMERCIAL

## 2017-12-11 VITALS
HEIGHT: 64.5 IN | BODY MASS INDEX: 33.73 KG/M2 | DIASTOLIC BLOOD PRESSURE: 76 MMHG | RESPIRATION RATE: 17 BRPM | HEART RATE: 48 BPM | WEIGHT: 200 LBS | SYSTOLIC BLOOD PRESSURE: 107 MMHG

## 2017-12-11 PROCEDURE — 99213 OFFICE O/P EST LOW 20 MIN: CPT

## 2017-12-11 RX ORDER — TRAMADOL HYDROCHLORIDE 25 MG/1
TABLET, COATED ORAL
Refills: 0 | Status: ACTIVE | COMMUNITY

## 2018-02-05 ENCOUNTER — APPOINTMENT (OUTPATIENT)
Dept: RADIOLOGY | Facility: CLINIC | Age: 38
End: 2018-02-05
Payer: COMMERCIAL

## 2018-02-05 ENCOUNTER — OUTPATIENT (OUTPATIENT)
Dept: OUTPATIENT SERVICES | Facility: HOSPITAL | Age: 38
LOS: 1 days | End: 2018-02-05
Payer: COMMERCIAL

## 2018-02-05 ENCOUNTER — APPOINTMENT (OUTPATIENT)
Dept: SPINE | Facility: CLINIC | Age: 38
End: 2018-02-05

## 2018-02-05 DIAGNOSIS — Z98.1 ARTHRODESIS STATUS: ICD-10-CM

## 2018-02-05 DIAGNOSIS — Z98.1 ARTHRODESIS STATUS: Chronic | ICD-10-CM

## 2018-02-05 DIAGNOSIS — Z98.890 OTHER SPECIFIED POSTPROCEDURAL STATES: Chronic | ICD-10-CM

## 2018-02-05 PROCEDURE — 72040 X-RAY EXAM NECK SPINE 2-3 VW: CPT

## 2018-02-05 PROCEDURE — 72040 X-RAY EXAM NECK SPINE 2-3 VW: CPT | Mod: 26

## 2018-02-28 ENCOUNTER — APPOINTMENT (OUTPATIENT)
Dept: ORTHOPEDIC SURGERY | Facility: CLINIC | Age: 38
End: 2018-02-28
Payer: COMMERCIAL

## 2018-02-28 VITALS — SYSTOLIC BLOOD PRESSURE: 115 MMHG | HEART RATE: 61 BPM | DIASTOLIC BLOOD PRESSURE: 80 MMHG

## 2018-02-28 VITALS — HEIGHT: 66 IN | BODY MASS INDEX: 32.14 KG/M2 | WEIGHT: 200 LBS

## 2018-02-28 DIAGNOSIS — Z98.1 ARTHRODESIS STATUS: ICD-10-CM

## 2018-02-28 DIAGNOSIS — M25.512 PAIN IN LEFT SHOULDER: ICD-10-CM

## 2018-02-28 PROCEDURE — 99203 OFFICE O/P NEW LOW 30 MIN: CPT
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
## 2018-03-05 ENCOUNTER — APPOINTMENT (OUTPATIENT)
Dept: SPINE | Facility: CLINIC | Age: 38
End: 2018-03-05
Payer: COMMERCIAL

## 2018-03-05 VITALS
SYSTOLIC BLOOD PRESSURE: 109 MMHG | RESPIRATION RATE: 17 BRPM | DIASTOLIC BLOOD PRESSURE: 78 MMHG | WEIGHT: 200 LBS | HEIGHT: 66 IN | BODY MASS INDEX: 32.14 KG/M2

## 2018-03-05 VITALS — SYSTOLIC BLOOD PRESSURE: 104 MMHG | RESPIRATION RATE: 17 BRPM | DIASTOLIC BLOOD PRESSURE: 68 MMHG | HEART RATE: 61 BPM

## 2018-03-05 DIAGNOSIS — M54.12 RADICULOPATHY, CERVICAL REGION: ICD-10-CM

## 2018-03-05 DIAGNOSIS — M48.00 SPINAL STENOSIS, SITE UNSPECIFIED: ICD-10-CM

## 2018-03-05 PROCEDURE — 99213 OFFICE O/P EST LOW 20 MIN: CPT

## 2018-03-29 ENCOUNTER — OUTPATIENT (OUTPATIENT)
Dept: OUTPATIENT SERVICES | Facility: HOSPITAL | Age: 38
LOS: 1 days | End: 2018-03-29
Payer: COMMERCIAL

## 2018-03-29 ENCOUNTER — APPOINTMENT (OUTPATIENT)
Dept: MRI IMAGING | Facility: CLINIC | Age: 38
End: 2018-03-29
Payer: COMMERCIAL

## 2018-03-29 ENCOUNTER — APPOINTMENT (OUTPATIENT)
Dept: RADIOLOGY | Facility: CLINIC | Age: 38
End: 2018-03-29
Payer: COMMERCIAL

## 2018-03-29 DIAGNOSIS — Z98.1 ARTHRODESIS STATUS: Chronic | ICD-10-CM

## 2018-03-29 DIAGNOSIS — Z98.1 ARTHRODESIS STATUS: ICD-10-CM

## 2018-03-29 DIAGNOSIS — Z98.890 OTHER SPECIFIED POSTPROCEDURAL STATES: Chronic | ICD-10-CM

## 2018-03-29 DIAGNOSIS — G95.9 DISEASE OF SPINAL CORD, UNSPECIFIED: ICD-10-CM

## 2018-03-29 PROCEDURE — 72040 X-RAY EXAM NECK SPINE 2-3 VW: CPT

## 2018-03-29 PROCEDURE — 72040 X-RAY EXAM NECK SPINE 2-3 VW: CPT | Mod: 26

## 2018-03-29 PROCEDURE — 72141 MRI NECK SPINE W/O DYE: CPT

## 2018-03-29 PROCEDURE — 72141 MRI NECK SPINE W/O DYE: CPT | Mod: 26

## 2018-04-12 ENCOUNTER — TRANSCRIPTION ENCOUNTER (OUTPATIENT)
Age: 38
End: 2018-04-12

## 2018-07-24 PROBLEM — M54.5 LUMBAR BACK PAIN: Status: ACTIVE | Noted: 2017-09-11
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
## 2019-04-14 NOTE — PHYSICAL THERAPY INITIAL EVALUATION ADULT - LEVEL OF INDEPENDENCE: STAIR NEGOTIATION, REHAB EVAL
F: IV NS @ 60cc/hr  E: Replete PRN  N: clear liquid  DVT ppx: HSQ  Dispo: RMF  FULL CODE
F: IV NS @ 125cc/hr  E: Replete PRN  N: NPO   DVT ppx: HSQ  Dispo: RMF  FULL CODE
F: IV NS @ 60cc/hr  E: Replete PRN  N: clear liquid  DVT ppx: HSQ  Dispo: RMF  FULL CODE
F: no IVF  E: Replete PRN  N: low fiber diet  DVT ppx: HSQ  Dispo: RMF  FULL CODE
F: IV NS @ 75cc/hr  E: Replete PRN  N: NPO   DVT ppx: HSQ  Dispo: RMF  FULL CODE
TBD

## 2019-05-30 ENCOUNTER — RESULT REVIEW (OUTPATIENT)
Age: 39
End: 2019-05-30

## 2019-11-03 ENCOUNTER — EMERGENCY (EMERGENCY)
Facility: HOSPITAL | Age: 39
LOS: 1 days | Discharge: HOME | End: 2019-11-03
Attending: EMERGENCY MEDICINE | Admitting: EMERGENCY MEDICINE
Payer: SELF-PAY

## 2019-11-03 VITALS
HEART RATE: 72 BPM | OXYGEN SATURATION: 100 % | DIASTOLIC BLOOD PRESSURE: 77 MMHG | SYSTOLIC BLOOD PRESSURE: 114 MMHG | RESPIRATION RATE: 17 BRPM | TEMPERATURE: 99 F

## 2019-11-03 VITALS
RESPIRATION RATE: 18 BRPM | WEIGHT: 199.96 LBS | OXYGEN SATURATION: 96 % | SYSTOLIC BLOOD PRESSURE: 115 MMHG | HEART RATE: 77 BPM | HEIGHT: 66 IN | DIASTOLIC BLOOD PRESSURE: 82 MMHG | TEMPERATURE: 99 F

## 2019-11-03 DIAGNOSIS — Z98.1 ARTHRODESIS STATUS: Chronic | ICD-10-CM

## 2019-11-03 DIAGNOSIS — Z98.890 OTHER SPECIFIED POSTPROCEDURAL STATES: Chronic | ICD-10-CM

## 2019-11-03 PROBLEM — M21.069 VALGUS DEFORMITY, NOT ELSEWHERE CLASSIFIED, UNSPECIFIED KNEE: Chronic | Status: ACTIVE | Noted: 2017-07-21

## 2019-11-03 LAB
ALBUMIN SERPL ELPH-MCNC: 4.3 G/DL — SIGNIFICANT CHANGE UP (ref 3.3–5)
ALP SERPL-CCNC: 62 U/L — SIGNIFICANT CHANGE UP (ref 40–120)
ALT FLD-CCNC: 7 U/L — LOW (ref 10–45)
ANION GAP SERPL CALC-SCNC: 8 MMOL/L — SIGNIFICANT CHANGE UP (ref 5–17)
AST SERPL-CCNC: 12 U/L — SIGNIFICANT CHANGE UP (ref 10–40)
BASOPHILS # BLD AUTO: 0.03 K/UL — SIGNIFICANT CHANGE UP (ref 0–0.2)
BASOPHILS NFR BLD AUTO: 0.5 % — SIGNIFICANT CHANGE UP (ref 0–2)
BILIRUB SERPL-MCNC: 0.4 MG/DL — SIGNIFICANT CHANGE UP (ref 0.2–1.2)
BUN SERPL-MCNC: 8 MG/DL — SIGNIFICANT CHANGE UP (ref 7–23)
CALCIUM SERPL-MCNC: 9.1 MG/DL — SIGNIFICANT CHANGE UP (ref 8.4–10.5)
CHLORIDE SERPL-SCNC: 104 MMOL/L — SIGNIFICANT CHANGE UP (ref 96–108)
CO2 SERPL-SCNC: 27 MMOL/L — SIGNIFICANT CHANGE UP (ref 22–31)
CREAT SERPL-MCNC: 0.72 MG/DL — SIGNIFICANT CHANGE UP (ref 0.5–1.3)
EOSINOPHIL # BLD AUTO: 0.1 K/UL — SIGNIFICANT CHANGE UP (ref 0–0.5)
EOSINOPHIL NFR BLD AUTO: 1.5 % — SIGNIFICANT CHANGE UP (ref 0–6)
GLUCOSE SERPL-MCNC: 90 MG/DL — SIGNIFICANT CHANGE UP (ref 70–99)
HCT VFR BLD CALC: 41.4 % — SIGNIFICANT CHANGE UP (ref 34.5–45)
HGB BLD-MCNC: 13.7 G/DL — SIGNIFICANT CHANGE UP (ref 11.5–15.5)
IMM GRANULOCYTES NFR BLD AUTO: 0.5 % — SIGNIFICANT CHANGE UP (ref 0–1.5)
LYMPHOCYTES # BLD AUTO: 1.34 K/UL — SIGNIFICANT CHANGE UP (ref 1–3.3)
LYMPHOCYTES # BLD AUTO: 20.2 % — SIGNIFICANT CHANGE UP (ref 13–44)
MAGNESIUM SERPL-MCNC: 2 MG/DL — SIGNIFICANT CHANGE UP (ref 1.6–2.6)
MCHC RBC-ENTMCNC: 29.9 PG — SIGNIFICANT CHANGE UP (ref 27–34)
MCHC RBC-ENTMCNC: 33.1 GM/DL — SIGNIFICANT CHANGE UP (ref 32–36)
MCV RBC AUTO: 90.4 FL — SIGNIFICANT CHANGE UP (ref 80–100)
MONOCYTES # BLD AUTO: 0.39 K/UL — SIGNIFICANT CHANGE UP (ref 0–0.9)
MONOCYTES NFR BLD AUTO: 5.9 % — SIGNIFICANT CHANGE UP (ref 2–14)
NEUTROPHILS # BLD AUTO: 4.74 K/UL — SIGNIFICANT CHANGE UP (ref 1.8–7.4)
NEUTROPHILS NFR BLD AUTO: 71.4 % — SIGNIFICANT CHANGE UP (ref 43–77)
NRBC # BLD: 0 /100 WBCS — SIGNIFICANT CHANGE UP (ref 0–0)
PHOSPHATE SERPL-MCNC: 2.7 MG/DL — SIGNIFICANT CHANGE UP (ref 2.5–4.5)
PLATELET # BLD AUTO: 330 K/UL — SIGNIFICANT CHANGE UP (ref 150–400)
POTASSIUM SERPL-MCNC: 4.2 MMOL/L — SIGNIFICANT CHANGE UP (ref 3.5–5.3)
POTASSIUM SERPL-SCNC: 4.2 MMOL/L — SIGNIFICANT CHANGE UP (ref 3.5–5.3)
PROT SERPL-MCNC: 6.9 G/DL — SIGNIFICANT CHANGE UP (ref 6–8.3)
RBC # BLD: 4.58 M/UL — SIGNIFICANT CHANGE UP (ref 3.8–5.2)
RBC # FLD: 13.4 % — SIGNIFICANT CHANGE UP (ref 10.3–14.5)
SODIUM SERPL-SCNC: 139 MMOL/L — SIGNIFICANT CHANGE UP (ref 135–145)
WBC # BLD: 6.63 K/UL — SIGNIFICANT CHANGE UP (ref 3.8–10.5)
WBC # FLD AUTO: 6.63 K/UL — SIGNIFICANT CHANGE UP (ref 3.8–10.5)

## 2019-11-03 PROCEDURE — 99284 EMERGENCY DEPT VISIT MOD MDM: CPT

## 2019-11-03 PROCEDURE — 84100 ASSAY OF PHOSPHORUS: CPT

## 2019-11-03 PROCEDURE — 85027 COMPLETE CBC AUTOMATED: CPT

## 2019-11-03 PROCEDURE — 83735 ASSAY OF MAGNESIUM: CPT

## 2019-11-03 PROCEDURE — 80053 COMPREHEN METABOLIC PANEL: CPT

## 2019-11-03 PROCEDURE — 87040 BLOOD CULTURE FOR BACTERIA: CPT

## 2019-11-03 RX ORDER — CIPROFLOXACIN HCL 0.3 %
2 DROPS OPHTHALMIC (EYE)
Qty: 1 | Refills: 0
Start: 2019-11-03 | End: 2019-11-09

## 2019-11-03 RX ORDER — CEFPODOXIME PROXETIL 100 MG
1 TABLET ORAL
Qty: 14 | Refills: 0
Start: 2019-11-03 | End: 2019-11-09

## 2019-11-03 RX ORDER — TRAMADOL HYDROCHLORIDE 50 MG/1
1 TABLET ORAL
Qty: 0 | Refills: 0 | DISCHARGE

## 2019-11-03 RX ORDER — ERYTHROMYCIN BASE 5 MG/GRAM
1 OINTMENT (GRAM) OPHTHALMIC (EYE)
Qty: 7 | Refills: 0
Start: 2019-11-03 | End: 2019-11-09

## 2019-11-03 NOTE — ED PROVIDER NOTE - NSFOLLOWUPCLINICS_GEN_ALL_ED_FT
[facility] - Ophthalmology  Ophthalmology  [address], [address]  [place] [address]  Phone: [phone]  Fax:   Follow Up Time: 7-10 Days    [facility]  Ophthalmology  [address] [facility] [facility] [facility] [address] [facility] [address]  [place] [address]  Phone: [phone]  Fax:   Follow Up Time: 7-10 Days

## 2019-11-03 NOTE — ED ADULT NURSE NOTE - OBJECTIVE STATEMENT
Pt came in c/o right eye swelling, redness and pain since yesterday. Pt c/o photophobia. + drainage from eye. No distress. Breathing easy and non labored. Pt ambulatory.

## 2019-11-03 NOTE — ED PROVIDER NOTE - ATTENDING CONTRIBUTION TO CARE
40 yo with chief complaint  of right eye pain and swelling along with right facial pain and swelling, including the jaw, ear and mouth. Onset yesterday. + photophobia to the right eye with yellow discharge. + chills without sick contacts. no measured fevers. 40 yo with chief complaint  of right eye pain and swelling along with right facial pain and swelling, including the jaw, ear and mouth. Onset yesterday. + photophobia to the right eye with yellow discharge. + chills without sick contacts. no measured fevers.  Patient educated on concerning signs and features to return to the emergency department, in layman terms, including but not limited to: nausea, vomiting, fever, chills, persistent/worsening symptoms or any concerns at all. No immediate life threatening issues present on history or clinical exam. Patient is a safe disposition home, has capacity and insight into their condition, is ambulatory in the Emergency Department with no further questions and will follow up with their doctor(s) this week. Patient understands anticipatory guidance and was given strict return and follow up precautions. The patient has been informed, in layman terms, of all concerning signs and symptoms to return to Emergency Department, the necessity to follow up with the PMD/Clinic/follow up provided within 2-3 days was explained, and the patient reports understanding of above with capacity and insight. The patient was informed of the results of their exam and are was encouraged to follow up on the findings with their doctor (as well as the need to inform their doctor of the results). The patient and/or family are aware of the need to follow up with repeat testing as applicable and report understanding of the above with capacity and insight.

## 2019-11-03 NOTE — ED PROVIDER NOTE - NS ED ROS FT
REVIEW OF SYSTEMS:    CONSTITUTIONAL: No weakness, fevers, +chills  EYES/ENT: +visual changes (photophobia) +eye pain, swelling, discharge;  no throat pain, +jaw pain, +ear pain  NECK: No pain or stiffness  RESPIRATORY: No cough, wheezing, hemoptysis; No shortness of breath  CARDIOVASCULAR: No chest pain or palpitations  GASTROINTESTINAL: No abdominal or epigastric pain. No nausea, vomiting, or hematemesis; No diarrhea or constipation. No melena or hematochezia.  GENITOURINARY: No dysuria, change in frequency or hematuria  NEUROLOGICAL: No numbness or weakness  SKIN: No itching, burning, rashes, or lesions   All other review of systems is negative unless indicated above.

## 2019-11-03 NOTE — ED PROVIDER NOTE - CLINICAL SUMMARY MEDICAL DECISION MAKING FREE TEXT BOX
Pt with r eye discomfort, photophobia, periorbital swelling and erythema x1 day. Acuity 20/30 OD, 20/20 OS. Slit lamp exam with keratitis, no overt corneal abrasion. Will discharge with cipro eye drops, erythromycin ointment, and oral cefpodoxime BID for 7 days, outpatient follow up with ophthamology.

## 2019-11-03 NOTE — ED PROVIDER NOTE - PATIENT PORTAL LINK FT
You can access the FollowMyHealth Patient Portal offered by [facility] by registering at the following website: [email or web address]. By joining [facility]’s FollowMyHealth portal, you will also be able to view your health information using other applications (apps) compatible with our system.

## 2019-11-03 NOTE — ED PROVIDER NOTE - PLAN OF CARE
Evaluate right eye, treatment Pt with r eye discomfort, photophobia, periorbital swelling and erythema x1 day. Acuity 20/30 OD, 20/20 OS. Slit lamp exam with keratitis, no overt corneal abrasion. Will discharge with cipro eye drops, erythromycin ointment, and oral cefpodoxime BID for 7 days, outpatient follow up with ophthamology.

## 2019-11-03 NOTE — ED PROVIDER NOTE - PHYSICAL EXAMINATION
GENERAL: Uncomfortable appearing  HEENT: PERRL, R orbial edema, r scleral erythema, +clear fluid draining from r eye, +r photophobia, red reflex+ b/l. no oropharyngeal lesions or erythema appreciated. mild pain with pressure to right lower molars, no sign of abcess. R tympanic membrane clear, L with visible clear fluid behind TM. +preauricular palpable LN, mobile, on right.  Pulm: normal work of breathing, CTABL  CV: RRR, S1&S2+, no m/r/g appreciated  ABDOMEN: soft, nt, nd, no hepatosplenomegaly, BS+  MSK: nl ROM  EXTREMITIES:  no appreciable edema in b/l LE  Neuro: A&Ox3, no focal deficits  SKIN: warm and dry, no visible rash GENERAL: Uncomfortable appearing  HEENT: PERRL, R orbial edema, r scleral erythema, +clear fluid draining from r eye, +r photophobia, red reflex+ b/l. no oropharyngeal lesions or erythema appreciated. mild pain with pressure to right lower molars, no sign of abscess, 20/20 od, 20/30 os, slight uptake of fluorescin to right cornea resembling keratitis, R tympanic membrane clear, L with visible clear fluid behind TM. +preauricular palpable LN, mobile, on right. no pain on eom  Pulm: normal work of breathing, CTABL  CV: RRR, S1&S2+, no m/r/g appreciated  ABDOMEN: soft, nt, nd, no hepatosplenomegaly, BS+  MSK: nl ROM  EXTREMITIES:  no appreciable edema in b/l LE  Neuro: A&Ox3, no focal deficits  SKIN: warm and dry, no visible rash

## 2019-11-03 NOTE — ED PROVIDER NOTE - PROGRESS NOTE DETAILS
CBC, CMP pending. Will slit lamp examine patient. Slit lamp exam with keratonitis, no overt abrasion. Visual acuity 20/30 OD, 20/20 OS. Will discharge on cipro drops, erythomycin ointment and oral cefdox for 7 days, to follow up with ophthomology in one week. Slit lamp exam with kerotonitis, no overt abrasion. Visual acuity 20/30 OD, 20/20 OS. Will discharge on cipro drops, erythomycin ointment and oral cefpodoxime for 7 days, to follow up with ophthalmology with in one week.

## 2019-11-03 NOTE — ED ADULT NURSE NOTE - NSIMPLEMENTINTERV_GEN_ALL_ED
Implemented All Universal Safety Interventions:  [place] to call system. Call bell, personal items and telephone within reach. Instruct patient to call for assistance. Room bathroom lighting operational. Non-slip footwear when patient is off stretcher. Physically safe environment: no spills, clutter or unnecessary equipment. Stretcher in lowest position, wheels locked, appropriate side rails in place.

## 2019-11-03 NOTE — ED PROVIDER NOTE - CARE PLAN
Principal Discharge DX:	Keratitis  Goal:	Evaluate right eye, treatment  Assessment and plan of treatment:	Pt with r eye discomfort, photophobia, periorbital swelling and erythema x1 day. Acuity 20/30 OD, 20/20 OS. Slit lamp exam with keratitis, no overt corneal abrasion. Will discharge with cipro eye drops, erythromycin ointment, and oral cefpodoxime BID for 7 days, outpatient follow up with ophthamology. Principal Discharge DX:	Keratitis  Goal:	Evaluate right eye, treatment  Assessment and plan of treatment:	Pt with r eye discomfort, photophobia, periorbital swelling and erythema x1 day. Acuity 20/30 OD, 20/20 OS. Slit lamp exam with keratitis, no overt corneal abrasion. Will discharge with cipro eye drops, erythromycin ointment, and oral cefpodoxime BID for 7 days, outpatient follow up with ophthamology.  Secondary Diagnosis:	Preseptal cellulitis of right eye

## 2019-11-03 NOTE — ED PROVIDER NOTE - CHIEF COMPLAINT
The patient is a 39y Female complaining of eye redness. The patient is a 39y Female complaining of eye swelling, right facial swelling.
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
## 2019-11-03 NOTE — ED PROVIDER NOTE - NSFOLLOWUPINSTRUCTIONS_ED_ALL_ED_FT
Please use cipro eye drops (2 drops in right eye every four hours), erythromycin ointment at bedtime in the right eye, and oral cefpodoxime twice a day for 7 days. If you should develop pain with movement of your eye, fever, increased swelling, vision change please return to the ER/medical provider immediately. Please follow up with ophthamology in one week.

## 2019-11-08 LAB
CULTURE RESULTS: SIGNIFICANT CHANGE UP
CULTURE RESULTS: SIGNIFICANT CHANGE UP
SPECIMEN SOURCE: SIGNIFICANT CHANGE UP
SPECIMEN SOURCE: SIGNIFICANT CHANGE UP

## 2019-11-10 ENCOUNTER — EMERGENCY (EMERGENCY)
Facility: HOSPITAL | Age: 39
LOS: 1 days | Discharge: HOME | End: 2019-11-10
Attending: EMERGENCY MEDICINE
Payer: SELF-PAY

## 2019-11-10 VITALS
DIASTOLIC BLOOD PRESSURE: 73 MMHG | RESPIRATION RATE: 19 BRPM | HEART RATE: 69 BPM | WEIGHT: 199.96 LBS | OXYGEN SATURATION: 98 % | TEMPERATURE: 98 F | HEIGHT: 66 IN | SYSTOLIC BLOOD PRESSURE: 107 MMHG

## 2019-11-10 DIAGNOSIS — Z98.890 OTHER SPECIFIED POSTPROCEDURAL STATES: Chronic | ICD-10-CM

## 2019-11-10 DIAGNOSIS — Z98.1 ARTHRODESIS STATUS: Chronic | ICD-10-CM

## 2019-11-10 PROCEDURE — 99284 EMERGENCY DEPT VISIT MOD MDM: CPT | Mod: 25

## 2019-11-10 PROCEDURE — 99283 EMERGENCY DEPT VISIT LOW MDM: CPT

## 2019-11-10 RX ORDER — IBUPROFEN 200 MG
600 TABLET ORAL ONCE
Refills: 0 | Status: COMPLETED | OUTPATIENT
Start: 2019-11-10 | End: 2019-11-10

## 2019-11-10 RX ADMIN — Medication 600 MILLIGRAM(S): at 13:24

## 2019-11-10 NOTE — ED PROVIDER NOTE - PATIENT PORTAL LINK FT
You can access the FollowMyHealth Patient Portal offered by [facility] by registering at the following website: [email or web address]. By joining [employer]’s FollowMyHealth portal, you will also be able to view your health information using other applications (apps) compatible with our system.

## 2019-11-10 NOTE — ED PROVIDER NOTE - CLINICAL SUMMARY MEDICAL DECISION MAKING FREE TEXT BOX
b/l conjunctivitis, periorbital edema, discharge, mild chemosis, fevers, chills, lymphadenopathy - worsening symptoms now spreading to left eye - likely viral pink eye vs early orbital cellulitis. Given patient has pain with eye movement, plan for ophthalmology consult.

## 2019-11-10 NOTE — ED ADULT NURSE NOTE - CHIEF COMPLAINT QUOTE
faraz eye redness, swelling, watery x  1 week, seen [facility] ED last sunday, "seeing halos", "blurry", "ears clogged...lymph nodes swollen", seen by optho as oupt: started on abx

## 2019-11-10 NOTE — ED PROVIDER NOTE - PROGRESS NOTE DETAILS
Pt reports that her ophthalmologist recommended going to the emergency department if she had worsening symptoms to see if she required IV antibiotics for a possible secondary infections. Pt reports that if she is currently not requiring IV antibiotics she would prefer to be discharged home to make an appointment with her own ophthalmologist instead of waiting to be seen by ophtho in the ED. Ophthalmology paged multiple times since 11 AM at [phone], with no call back. Back up number paged at [phone] and [phone]. Shiraz CLARK: Patient requesting to be discharged, states she will see her own doctor. Given worsening pain and recommendation by her doctor for IV antibiotics - pt advised to stay for ophthalmology evaluation. Risks/ benefits discussed in detail including worsening infection that could impact her eyesight. Patient states she understands but wants to be discharged. Discussed attempts to call ophthalmologist, Dr. Estevez - attending physician. Discussed consult with Dr. Canchola who is on way to see patient. Cell number: [phone]. Ophthalmology evaluating patient. PEr ophthalmology - likely viral.

## 2019-11-10 NOTE — ED PROVIDER NOTE - NSFOLLOWUPINSTRUCTIONS_ED_ALL_ED_FT
Continue using prescribed eye drops and antibiotics.     Follow up with your ophthalmologist as soon as possible.    continue taking all prescribed home medications.     Return to the emergency department if you have any new or worsening symptoms including persistent eye pain, eye redness, worsening eye swelling and surrounding redness,  fevers, chills, nausea, vomiting. Continue using tobramycin eye drops. Start using artificial tears three times a day.     Follow up with your ophthalmologist in the next several days.    continue taking all prescribed home medications.     Return to the emergency department if you have any new or worsening symptoms including persistent eye pain, eye redness, worsening eye swelling and surrounding redness,  fevers, chills, nausea, vomiting.    Please see additional packet for further information.

## 2019-11-10 NOTE — ED ADULT NURSE NOTE - CHIEF COMPLAINT
The patient is a 39y Female complaining of The patient is a 39y Female complaining of bilat eye redness and pain
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
## 2019-11-10 NOTE — ED ADULT NURSE NOTE - OBJECTIVE STATEMENT
39 yr old female came in with bilate eye redness and ear pain as well as sinus pain, seeing halos. on assessment a and o x 3 lungs clear abd soft non tender no swelling in extremities no n/v/d no fevers no other complaints. waiting for ortho consulate.

## 2019-11-10 NOTE — ED PROVIDER NOTE - NSFOLLOWUPCLINICS_GEN_ALL_ED_FT
[facility] - Ophthalmology  Ophthalmology  [address]  [place] [address]  Phone: [phone]  Fax:   Follow Up Time:

## 2019-11-10 NOTE — ED ADULT TRIAGE NOTE - CHIEF COMPLAINT QUOTE
faraz eye redness, swelling, watery x  1 week, seen [facility] ED last sunday, "seeing halos", "blurry", "ears clogged...lymph nodes swollen", seen by optho as oupt: started on abx

## 2019-11-10 NOTE — ED ADULT NURSE NOTE - NSIMPLEMENTINTERV_GEN_ALL_ED
Implemented All Universal Safety Interventions:  [place] to call system. Call bell, personal items and telephone within reach. Instruct patient to call for assistance. Room bathroom lighting operational. Non-slip footwear when patient is off stretcher. Physically safe environment: no spills, clutter or unnecessary equipment. Stretcher in lowest position, wheels locked, appropriate side rails in place.

## 2019-11-10 NOTE — ED PROVIDER NOTE - PHYSICAL EXAMINATION
visual acuity: 20/70 bilaterally  eyes: PERRL, bilateral periorbital swelling, b/l conjunctivitis with discharge, mild chemosis visual acuity: 20/70 bilaterally, no fluroscein uptake on exam.   eyes: PERRL, bilateral periorbital swelling, b/l conjunctivitis with discharge, mild chemosis

## 2019-11-10 NOTE — ED PROVIDER NOTE - OBJECTIVE STATEMENT
Patient is a 38 yo F here for evaluation of bilateral eye pain. Patient states she was seen last week for right sided eye pain associated with with fever/ chills. She was evaluated and told she has keratitis Patient is a 38 yo F here for evaluation of bilateral eye pain. Patient states she was seen last week for right sided eye pain associated with with fever/ chills. She was evaluated and told she has keratitis. She followed up with an ophthalmologist and was told she has pink eye and was give tobramycin/ dexamethasone drops 4 times a day and started on augmentin. She states since then, her eyes are worse. The infection has spread to the left eye. She reports fevers, chills, lymph node swelling and pain with eye movement. Patient is a 40 yo F here for evaluation of bilateral eye pain. Patient states she was seen last week for right sided eye pain associated with with fever/ chills. She was evaluated and told she has keratitis. She followed up with an ophthalmologist and was told she has pink eye and was give tobramycin/ dexamethasone drops 4 times a day and started on augmentin. She states since then, her eyes are worse. The infection has spread to the left eye. She reports fevers, chills, lymph node swelling and pain with eye movement. Patient states her doctor told her she may need IV antibiotics. Shiraz CLARK: Patient is a 38 yo F here for evaluation of bilateral eye pain. Patient states she was seen last week for right sided eye pain associated with with fever/ chills. She was evaluated and told she has keratitis. She followed up with an ophthalmologist and was told she has pink eye and was give tobramycin/ dexamethasone drops 4 times a day and started on augmentin. She states since then, her eyes are worse. The infection has spread to the left eye. She reports fevers, chills, lymph node swelling and pain with eye movement. Patient states her doctor told her she may need IV antibiotics.

## 2020-01-23 ENCOUNTER — APPOINTMENT (OUTPATIENT)
Dept: OPHTHALMOLOGY | Facility: CLINIC | Age: 40
End: 2020-01-23

## 2020-02-18 ENCOUNTER — EMERGENCY (EMERGENCY)
Facility: HOSPITAL | Age: 40
LOS: 1 days | Discharge: LEFT BEFORE DISCHARGE | End: 2020-02-18
Payer: COMMERCIAL

## 2020-02-18 VITALS
HEIGHT: 66 IN | TEMPERATURE: 99 F | WEIGHT: 199.96 LBS | HEART RATE: 88 BPM | SYSTOLIC BLOOD PRESSURE: 115 MMHG | OXYGEN SATURATION: 99 % | RESPIRATION RATE: 20 BRPM | DIASTOLIC BLOOD PRESSURE: 78 MMHG

## 2020-02-18 VITALS
OXYGEN SATURATION: 98 % | HEART RATE: 87 BPM | TEMPERATURE: 98 F | RESPIRATION RATE: 16 BRPM | DIASTOLIC BLOOD PRESSURE: 84 MMHG | SYSTOLIC BLOOD PRESSURE: 134 MMHG

## 2020-02-18 DIAGNOSIS — Z98.1 ARTHRODESIS STATUS: Chronic | ICD-10-CM

## 2020-02-18 DIAGNOSIS — Z98.890 OTHER SPECIFIED POSTPROCEDURAL STATES: Chronic | ICD-10-CM

## 2020-02-18 LAB
ALBUMIN SERPL ELPH-MCNC: 4.2 G/DL — SIGNIFICANT CHANGE UP (ref 3.3–5)
ALP SERPL-CCNC: 62 U/L — SIGNIFICANT CHANGE UP (ref 40–120)
ALT FLD-CCNC: 9 U/L — LOW (ref 10–45)
ANION GAP SERPL CALC-SCNC: 15 MMOL/L — SIGNIFICANT CHANGE UP (ref 5–17)
APTT BLD: 32.4 SEC — SIGNIFICANT CHANGE UP (ref 27.5–36.3)
AST SERPL-CCNC: 11 U/L — SIGNIFICANT CHANGE UP (ref 10–40)
BASE EXCESS BLDV CALC-SCNC: 3.6 MMOL/L — HIGH (ref -2–2)
BASOPHILS # BLD AUTO: 0.05 K/UL — SIGNIFICANT CHANGE UP (ref 0–0.2)
BASOPHILS NFR BLD AUTO: 0.4 % — SIGNIFICANT CHANGE UP (ref 0–2)
BILIRUB SERPL-MCNC: 0.4 MG/DL — SIGNIFICANT CHANGE UP (ref 0.2–1.2)
BUN SERPL-MCNC: 6 MG/DL — LOW (ref 7–23)
CA-I SERPL-SCNC: 1.18 MMOL/L — SIGNIFICANT CHANGE UP (ref 1.12–1.3)
CALCIUM SERPL-MCNC: 9.4 MG/DL — SIGNIFICANT CHANGE UP (ref 8.4–10.5)
CHLORIDE BLDV-SCNC: 103 MMOL/L — SIGNIFICANT CHANGE UP (ref 96–108)
CHLORIDE SERPL-SCNC: 100 MMOL/L — SIGNIFICANT CHANGE UP (ref 96–108)
CO2 BLDV-SCNC: 30 MMOL/L — SIGNIFICANT CHANGE UP (ref 22–30)
CO2 SERPL-SCNC: 23 MMOL/L — SIGNIFICANT CHANGE UP (ref 22–31)
CREAT SERPL-MCNC: 0.76 MG/DL — SIGNIFICANT CHANGE UP (ref 0.5–1.3)
EOSINOPHIL # BLD AUTO: 0.12 K/UL — SIGNIFICANT CHANGE UP (ref 0–0.5)
EOSINOPHIL NFR BLD AUTO: 1 % — SIGNIFICANT CHANGE UP (ref 0–6)
GAS PNL BLDV: 135 MMOL/L — SIGNIFICANT CHANGE UP (ref 135–145)
GAS PNL BLDV: SIGNIFICANT CHANGE UP
GAS PNL BLDV: SIGNIFICANT CHANGE UP
GLUCOSE BLDV-MCNC: 96 MG/DL — SIGNIFICANT CHANGE UP (ref 70–99)
GLUCOSE SERPL-MCNC: 98 MG/DL — SIGNIFICANT CHANGE UP (ref 70–99)
HCG SERPL-ACNC: <2 MIU/ML — SIGNIFICANT CHANGE UP
HCO3 BLDV-SCNC: 29 MMOL/L — SIGNIFICANT CHANGE UP (ref 21–29)
HCT VFR BLD CALC: 43 % — SIGNIFICANT CHANGE UP (ref 34.5–45)
HCT VFR BLDA CALC: 43 % — SIGNIFICANT CHANGE UP (ref 39–50)
HGB BLD CALC-MCNC: 14 G/DL — SIGNIFICANT CHANGE UP (ref 11.5–15.5)
HGB BLD-MCNC: 13.7 G/DL — SIGNIFICANT CHANGE UP (ref 11.5–15.5)
IMM GRANULOCYTES NFR BLD AUTO: 0.3 % — SIGNIFICANT CHANGE UP (ref 0–1.5)
INR BLD: 1.05 RATIO — SIGNIFICANT CHANGE UP (ref 0.88–1.16)
LACTATE BLDV-MCNC: 1.1 MMOL/L — SIGNIFICANT CHANGE UP (ref 0.7–2)
LIDOCAIN IGE QN: 26 U/L — SIGNIFICANT CHANGE UP (ref 7–60)
LYMPHOCYTES # BLD AUTO: 17.9 % — SIGNIFICANT CHANGE UP (ref 13–44)
LYMPHOCYTES # BLD AUTO: 2.13 K/UL — SIGNIFICANT CHANGE UP (ref 1–3.3)
MCHC RBC-ENTMCNC: 29.2 PG — SIGNIFICANT CHANGE UP (ref 27–34)
MCHC RBC-ENTMCNC: 31.9 GM/DL — LOW (ref 32–36)
MCV RBC AUTO: 91.7 FL — SIGNIFICANT CHANGE UP (ref 80–100)
MONOCYTES # BLD AUTO: 0.55 K/UL — SIGNIFICANT CHANGE UP (ref 0–0.9)
MONOCYTES NFR BLD AUTO: 4.6 % — SIGNIFICANT CHANGE UP (ref 2–14)
NEUTROPHILS # BLD AUTO: 9.02 K/UL — HIGH (ref 1.8–7.4)
NEUTROPHILS NFR BLD AUTO: 75.8 % — SIGNIFICANT CHANGE UP (ref 43–77)
NRBC # BLD: 0 /100 WBCS — SIGNIFICANT CHANGE UP (ref 0–0)
PCO2 BLDV: 48 MMHG — SIGNIFICANT CHANGE UP (ref 35–50)
PH BLDV: 7.39 — SIGNIFICANT CHANGE UP (ref 7.35–7.45)
PLATELET # BLD AUTO: 348 K/UL — SIGNIFICANT CHANGE UP (ref 150–400)
PO2 BLDV: 21 MMHG — LOW (ref 25–45)
POTASSIUM BLDV-SCNC: 4 MMOL/L — SIGNIFICANT CHANGE UP (ref 3.5–5.3)
POTASSIUM SERPL-MCNC: 3.9 MMOL/L — SIGNIFICANT CHANGE UP (ref 3.5–5.3)
POTASSIUM SERPL-SCNC: 3.9 MMOL/L — SIGNIFICANT CHANGE UP (ref 3.5–5.3)
PROT SERPL-MCNC: 7.2 G/DL — SIGNIFICANT CHANGE UP (ref 6–8.3)
PROTHROM AB SERPL-ACNC: 12.1 SEC — SIGNIFICANT CHANGE UP (ref 10–12.9)
RBC # BLD: 4.69 M/UL — SIGNIFICANT CHANGE UP (ref 3.8–5.2)
RBC # FLD: 13.8 % — SIGNIFICANT CHANGE UP (ref 10.3–14.5)
SAO2 % BLDV: 30 % — LOW (ref 67–88)
SODIUM SERPL-SCNC: 138 MMOL/L — SIGNIFICANT CHANGE UP (ref 135–145)
WBC # BLD: 11.91 K/UL — HIGH (ref 3.8–10.5)
WBC # FLD AUTO: 11.91 K/UL — HIGH (ref 3.8–10.5)

## 2020-02-18 PROCEDURE — 84295 ASSAY OF SERUM SODIUM: CPT

## 2020-02-18 PROCEDURE — 83690 ASSAY OF LIPASE: CPT

## 2020-02-18 PROCEDURE — 85027 COMPLETE CBC AUTOMATED: CPT

## 2020-02-18 PROCEDURE — 80053 COMPREHEN METABOLIC PANEL: CPT

## 2020-02-18 PROCEDURE — 82962 GLUCOSE BLOOD TEST: CPT

## 2020-02-18 PROCEDURE — 83605 ASSAY OF LACTIC ACID: CPT

## 2020-02-18 PROCEDURE — 82435 ASSAY OF BLOOD CHLORIDE: CPT

## 2020-02-18 PROCEDURE — 85610 PROTHROMBIN TIME: CPT

## 2020-02-18 PROCEDURE — 85014 HEMATOCRIT: CPT

## 2020-02-18 PROCEDURE — 84702 CHORIONIC GONADOTROPIN TEST: CPT

## 2020-02-18 PROCEDURE — 85730 THROMBOPLASTIN TIME PARTIAL: CPT

## 2020-02-18 PROCEDURE — 82947 ASSAY GLUCOSE BLOOD QUANT: CPT

## 2020-02-18 PROCEDURE — L9991: CPT

## 2020-02-18 PROCEDURE — 84132 ASSAY OF SERUM POTASSIUM: CPT

## 2020-02-18 PROCEDURE — 82803 BLOOD GASES ANY COMBINATION: CPT

## 2020-02-18 PROCEDURE — 82330 ASSAY OF CALCIUM: CPT

## 2020-02-18 RX ORDER — ACETAMINOPHEN 500 MG
975 TABLET ORAL ONCE
Refills: 0 | Status: COMPLETED | OUTPATIENT
Start: 2020-02-18 | End: 2020-02-18

## 2020-02-18 RX ADMIN — Medication 975 MILLIGRAM(S): at 17:29
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
## 2020-02-18 NOTE — ED PROVIDER NOTE - ATTENDING CONTRIBUTION TO CARE
I was unable to form a patient-physician relationship with this patient. The patient had left the hospital prior to this MD evaluating the patient. The patient was called 3 times overhead and searched for in the ED but could not be located, was presumed to have left and registered as a left without being seen. The patient was seen by the Qpa and there were no questions to this MD about the patient and the plan of care prior to the patient leaving without being seen.

## 2020-02-19 ENCOUNTER — EMERGENCY (EMERGENCY)
Facility: HOSPITAL | Age: 40
LOS: 1 days | Discharge: HOME | End: 2020-02-19
Attending: EMERGENCY MEDICINE
Payer: COMMERCIAL

## 2020-02-19 VITALS
RESPIRATION RATE: 18 BRPM | TEMPERATURE: 99 F | SYSTOLIC BLOOD PRESSURE: 106 MMHG | DIASTOLIC BLOOD PRESSURE: 66 MMHG | HEART RATE: 70 BPM | OXYGEN SATURATION: 99 %

## 2020-02-19 VITALS
HEIGHT: 66 IN | OXYGEN SATURATION: 97 % | SYSTOLIC BLOOD PRESSURE: 115 MMHG | WEIGHT: 199.96 LBS | TEMPERATURE: 97 F | HEART RATE: 73 BPM | DIASTOLIC BLOOD PRESSURE: 82 MMHG | RESPIRATION RATE: 20 BRPM

## 2020-02-19 DIAGNOSIS — Z98.890 OTHER SPECIFIED POSTPROCEDURAL STATES: Chronic | ICD-10-CM

## 2020-02-19 DIAGNOSIS — Z98.1 ARTHRODESIS STATUS: Chronic | ICD-10-CM

## 2020-02-19 LAB
ALBUMIN SERPL ELPH-MCNC: 3.3 G/DL — LOW (ref 3.5–5)
ALP SERPL-CCNC: 59 U/L — SIGNIFICANT CHANGE UP (ref 40–120)
ALT FLD-CCNC: 13 U/L DA — SIGNIFICANT CHANGE UP (ref 10–60)
ANION GAP SERPL CALC-SCNC: 7 MMOL/L — SIGNIFICANT CHANGE UP (ref 5–17)
AST SERPL-CCNC: 4 U/L — LOW (ref 10–40)
BASOPHILS # BLD AUTO: 0.06 K/UL — SIGNIFICANT CHANGE UP (ref 0–0.2)
BASOPHILS NFR BLD AUTO: 0.6 % — SIGNIFICANT CHANGE UP (ref 0–2)
BILIRUB SERPL-MCNC: 0.5 MG/DL — SIGNIFICANT CHANGE UP (ref 0.2–1.2)
BUN SERPL-MCNC: 5 MG/DL — LOW (ref 7–18)
CALCIUM SERPL-MCNC: 8.8 MG/DL — SIGNIFICANT CHANGE UP (ref 8.4–10.5)
CHLORIDE SERPL-SCNC: 104 MMOL/L — SIGNIFICANT CHANGE UP (ref 96–108)
CO2 SERPL-SCNC: 26 MMOL/L — SIGNIFICANT CHANGE UP (ref 22–31)
CREAT SERPL-MCNC: 0.7 MG/DL — SIGNIFICANT CHANGE UP (ref 0.5–1.3)
EOSINOPHIL # BLD AUTO: 0.09 K/UL — SIGNIFICANT CHANGE UP (ref 0–0.5)
EOSINOPHIL NFR BLD AUTO: 0.8 % — SIGNIFICANT CHANGE UP (ref 0–6)
GLUCOSE SERPL-MCNC: 87 MG/DL — SIGNIFICANT CHANGE UP (ref 70–99)
HCG SERPL-ACNC: <1 MIU/ML — SIGNIFICANT CHANGE UP
HCT VFR BLD CALC: 39.3 % — SIGNIFICANT CHANGE UP (ref 34.5–45)
HGB BLD-MCNC: 12.8 G/DL — SIGNIFICANT CHANGE UP (ref 11.5–15.5)
IMM GRANULOCYTES NFR BLD AUTO: 0.3 % — SIGNIFICANT CHANGE UP (ref 0–1.5)
LIDOCAIN IGE QN: 87 U/L — SIGNIFICANT CHANGE UP (ref 73–393)
LYMPHOCYTES # BLD AUTO: 1.78 K/UL — SIGNIFICANT CHANGE UP (ref 1–3.3)
LYMPHOCYTES # BLD AUTO: 16.4 % — SIGNIFICANT CHANGE UP (ref 13–44)
MCHC RBC-ENTMCNC: 29.8 PG — SIGNIFICANT CHANGE UP (ref 27–34)
MCHC RBC-ENTMCNC: 32.6 GM/DL — SIGNIFICANT CHANGE UP (ref 32–36)
MCV RBC AUTO: 91.6 FL — SIGNIFICANT CHANGE UP (ref 80–100)
MONOCYTES # BLD AUTO: 0.52 K/UL — SIGNIFICANT CHANGE UP (ref 0–0.9)
MONOCYTES NFR BLD AUTO: 4.8 % — SIGNIFICANT CHANGE UP (ref 2–14)
NEUTROPHILS # BLD AUTO: 8.35 K/UL — HIGH (ref 1.8–7.4)
NEUTROPHILS NFR BLD AUTO: 77.1 % — HIGH (ref 43–77)
NRBC # BLD: 0 /100 WBCS — SIGNIFICANT CHANGE UP (ref 0–0)
PLATELET # BLD AUTO: 314 K/UL — SIGNIFICANT CHANGE UP (ref 150–400)
POTASSIUM SERPL-MCNC: 3.7 MMOL/L — SIGNIFICANT CHANGE UP (ref 3.5–5.3)
POTASSIUM SERPL-SCNC: 3.7 MMOL/L — SIGNIFICANT CHANGE UP (ref 3.5–5.3)
PROT SERPL-MCNC: 7.1 G/DL — SIGNIFICANT CHANGE UP (ref 6–8.3)
RBC # BLD: 4.29 M/UL — SIGNIFICANT CHANGE UP (ref 3.8–5.2)
RBC # FLD: 13.9 % — SIGNIFICANT CHANGE UP (ref 10.3–14.5)
SODIUM SERPL-SCNC: 137 MMOL/L — SIGNIFICANT CHANGE UP (ref 135–145)
TROPONIN I SERPL-MCNC: <0.015 NG/ML — SIGNIFICANT CHANGE UP (ref 0–0.04)
WBC # BLD: 10.83 K/UL — HIGH (ref 3.8–10.5)
WBC # FLD AUTO: 10.83 K/UL — HIGH (ref 3.8–10.5)

## 2020-02-19 PROCEDURE — 76705 ECHO EXAM OF ABDOMEN: CPT

## 2020-02-19 PROCEDURE — 99285 EMERGENCY DEPT VISIT HI MDM: CPT

## 2020-02-19 PROCEDURE — 83690 ASSAY OF LIPASE: CPT

## 2020-02-19 PROCEDURE — 74177 CT ABD & PELVIS W/CONTRAST: CPT | Mod: 26

## 2020-02-19 PROCEDURE — 85027 COMPLETE CBC AUTOMATED: CPT

## 2020-02-19 PROCEDURE — 80053 COMPREHEN METABOLIC PANEL: CPT

## 2020-02-19 PROCEDURE — 74177 CT ABD & PELVIS W/CONTRAST: CPT

## 2020-02-19 PROCEDURE — 96374 THER/PROPH/DIAG INJ IV PUSH: CPT | Mod: XU

## 2020-02-19 PROCEDURE — 71045 X-RAY EXAM CHEST 1 VIEW: CPT | Mod: 26

## 2020-02-19 PROCEDURE — 99284 EMERGENCY DEPT VISIT MOD MDM: CPT | Mod: 25

## 2020-02-19 PROCEDURE — 84484 ASSAY OF TROPONIN QUANT: CPT

## 2020-02-19 PROCEDURE — 71045 X-RAY EXAM CHEST 1 VIEW: CPT

## 2020-02-19 PROCEDURE — 93005 ELECTROCARDIOGRAM TRACING: CPT

## 2020-02-19 PROCEDURE — 76705 ECHO EXAM OF ABDOMEN: CPT | Mod: 26

## 2020-02-19 PROCEDURE — 84702 CHORIONIC GONADOTROPIN TEST: CPT

## 2020-02-19 PROCEDURE — 36415 COLL VENOUS BLD VENIPUNCTURE: CPT

## 2020-02-19 RX ORDER — CIPROFLOXACIN LACTATE 400MG/40ML
400 VIAL (ML) INTRAVENOUS ONCE
Refills: 0 | Status: DISCONTINUED | OUTPATIENT
Start: 2020-02-19 | End: 2020-02-19

## 2020-02-19 RX ORDER — KETOROLAC TROMETHAMINE 30 MG/ML
30 SYRINGE (ML) INJECTION ONCE
Refills: 0 | Status: DISCONTINUED | OUTPATIENT
Start: 2020-02-19 | End: 2020-02-19

## 2020-02-19 RX ORDER — SODIUM CHLORIDE 9 MG/ML
1000 INJECTION INTRAMUSCULAR; INTRAVENOUS; SUBCUTANEOUS ONCE
Refills: 0 | Status: COMPLETED | OUTPATIENT
Start: 2020-02-19 | End: 2020-02-19

## 2020-02-19 RX ORDER — MOXIFLOXACIN HYDROCHLORIDE TABLETS, 400 MG 400 MG/1
1 TABLET, FILM COATED ORAL
Qty: 14 | Refills: 0
Start: 2020-02-19 | End: 2020-02-25

## 2020-02-19 RX ORDER — ONDANSETRON 8 MG/1
4 TABLET, FILM COATED ORAL ONCE
Refills: 0 | Status: DISCONTINUED | OUTPATIENT
Start: 2020-02-19 | End: 2020-03-03

## 2020-02-19 RX ORDER — METRONIDAZOLE 500 MG
500 TABLET ORAL ONCE
Refills: 0 | Status: DISCONTINUED | OUTPATIENT
Start: 2020-02-19 | End: 2020-02-19

## 2020-02-19 RX ORDER — ONDANSETRON 8 MG/1
4 TABLET, FILM COATED ORAL ONCE
Refills: 0 | Status: DISCONTINUED | OUTPATIENT
Start: 2020-02-19 | End: 2020-02-19

## 2020-02-19 RX ORDER — METRONIDAZOLE 500 MG
1 TABLET ORAL
Qty: 21 | Refills: 0
Start: 2020-02-19 | End: 2020-02-25

## 2020-02-19 RX ORDER — FAMOTIDINE 10 MG/ML
20 INJECTION INTRAVENOUS ONCE
Refills: 0 | Status: DISCONTINUED | OUTPATIENT
Start: 2020-02-19 | End: 2020-02-19

## 2020-02-19 RX ORDER — ONDANSETRON 8 MG/1
1 TABLET, FILM COATED ORAL
Qty: 10 | Refills: 0
Start: 2020-02-19

## 2020-02-19 RX ADMIN — Medication 30 MILLIGRAM(S): at 08:50

## 2020-02-19 RX ADMIN — SODIUM CHLORIDE 1000 MILLILITER(S): 9 INJECTION INTRAMUSCULAR; INTRAVENOUS; SUBCUTANEOUS at 08:50

## 2020-02-19 RX ADMIN — Medication 30 MILLIGRAM(S): at 09:10

## 2020-02-19 NOTE — ED ADULT NURSE NOTE - OBJECTIVE STATEMENT
c/o left chest pain radiating to the axilla and back. Pt states starting off with abdominal pain since thursday and now experiencing chest pain. Denies N/V, fever, or diaphoresis.

## 2020-02-19 NOTE — ED PROVIDER NOTE - PATIENT PORTAL LINK FT
You can access the FollowMyHealth Patient Portal offered by [facility] by registering at the following website: [email or web address]. By joining [employer]’s FollowMyHealth portal, you will also be able to view your health information using other applications (apps) compatible with our system.

## 2020-02-19 NOTE — ED ADULT NURSE NOTE - NSIMPLEMENTINTERV_GEN_ALL_ED
Implemented All Universal Safety Interventions:  [place] to call system. Call bell, personal items and telephone within reach. Instruct patient to call for assistance. Room bathroom lighting operational. Non-slip footwear when patient is off stretcher. Physically safe environment: no spills, clutter or unnecessary equipment. Stretcher in lowest position, wheels locked, appropriate side rails in place.

## 2020-02-19 NOTE — ED PROVIDER NOTE - CLINICAL SUMMARY MEDICAL DECISION MAKING FREE TEXT BOX
Patient with abdominal pain radiating to chest. Worst in RUQ. Will check labs, EKG, CXR, US GB. ddx: cholecystitis, pancreatitis, cholelithiasis, colitis, appendicitis, pneumonia.

## 2020-02-19 NOTE — ED PROVIDER NOTE - PROGRESS NOTE DETAILS
No diagnosis on ultrasound. Patient feels mildly improved but still significantly tender. Will get CT abdomen/pelvis Called to bedside by nurse. Patient starting to remove IV. States she will go to her PMD and get an outpatient CT. Very anxious but declined anxiety medicine. I convinced patient to stay for CT, siting risk of delayed diagnosis and treatment. Patient with colitis. I recommended admission. Patient declined. Will give first dose of abx IV. Patient does not want to wait for IV antibiotics or any other medications. Prescriptions sent ot pharmacy. Patient to return if unable to tolerate PO, fever, worsening pain, or any new or concerning sx. f/u with PMD in 1-2 days. Also informed of abnormal EKG and given copy to take to PMD. Heart score low risk

## 2020-02-19 NOTE — ED PROVIDER NOTE - OBJECTIVE STATEMENT
Patient reports 4 days of abdominal pain and nausea, 2 days of nasal congestion, mild cough. Was initially intermittent, but has been severe and constant since 3pm yesterday. Went to [facility], had lab tests, but left prior to seeing physician due to wait time. THis morning, developed diffuse chest pain that is worse with inspiration. Abd pain also worse with inspiration, eating, and changing position. Has had intermittent vomiting, NBNB. No fever, sob, diarrhea, dysuria, urgency, frequency

## 2020-04-30 ENCOUNTER — TRANSCRIPTION ENCOUNTER (OUTPATIENT)
Age: 40
End: 2020-04-30

## 2020-06-07 ENCOUNTER — EMERGENCY (EMERGENCY)
Facility: HOSPITAL | Age: 40
LOS: 1 days | Discharge: HOME | End: 2020-06-07
Attending: EMERGENCY MEDICINE
Payer: COMMERCIAL

## 2020-06-07 VITALS
HEART RATE: 76 BPM | RESPIRATION RATE: 16 BRPM | TEMPERATURE: 98 F | SYSTOLIC BLOOD PRESSURE: 117 MMHG | DIASTOLIC BLOOD PRESSURE: 80 MMHG | WEIGHT: 199.96 LBS | OXYGEN SATURATION: 98 %

## 2020-06-07 DIAGNOSIS — Z98.1 ARTHRODESIS STATUS: Chronic | ICD-10-CM

## 2020-06-07 DIAGNOSIS — Z98.890 OTHER SPECIFIED POSTPROCEDURAL STATES: Chronic | ICD-10-CM

## 2020-06-07 PROCEDURE — 99283 EMERGENCY DEPT VISIT LOW MDM: CPT | Mod: 25

## 2020-06-07 PROCEDURE — 73030 X-RAY EXAM OF SHOULDER: CPT

## 2020-06-07 PROCEDURE — 99283 EMERGENCY DEPT VISIT LOW MDM: CPT

## 2020-06-07 PROCEDURE — 73030 X-RAY EXAM OF SHOULDER: CPT | Mod: 26,LT

## 2020-06-07 RX ORDER — ACETAMINOPHEN 500 MG
975 TABLET ORAL ONCE
Refills: 0 | Status: COMPLETED | OUTPATIENT
Start: 2020-06-07 | End: 2020-06-07

## 2020-06-07 RX ORDER — IBUPROFEN 200 MG
600 TABLET ORAL ONCE
Refills: 0 | Status: COMPLETED | OUTPATIENT
Start: 2020-06-07 | End: 2020-06-07

## 2020-06-07 RX ORDER — OXYCODONE AND ACETAMINOPHEN 5; 325 MG/1; MG/1
1 TABLET ORAL ONCE
Refills: 0 | Status: DISCONTINUED | OUTPATIENT
Start: 2020-06-07 | End: 2020-06-07

## 2020-06-07 RX ADMIN — Medication 975 MILLIGRAM(S): at 03:44

## 2020-06-07 RX ADMIN — Medication 600 MILLIGRAM(S): at 03:33

## 2020-06-07 NOTE — ED PROVIDER NOTE - OBJECTIVE STATEMENT
40 yr old female with hx of cervical spine surgery presents to ed c/o left shoulder pain after stretching pta.  now pain with arm abduction. no numbness or tingling, no fall. hear a pop

## 2020-06-07 NOTE — ED PROVIDER NOTE - CLINICAL SUMMARY MEDICAL DECISION MAKING FREE TEXT BOX
40 yr old female with hx of cervical spine surgery presents to ed c/o left shoulder pain after stretching pta.  now pain with arm abduction. no numbness or tingling, no fall. hear a pop    likely rotator cuff tear anabel supraspinatus - unable to perform specific maneuver due to pain.  xr of shoulder, percocet, motrin, sling dc to ortho.

## 2020-06-07 NOTE — ED PROVIDER NOTE - PATIENT PORTAL LINK FT
You can access the FollowMyHealth Patient Portal offered by [facility] by registering at the following website: [email or web address]. By joining [employer]’s FollowMyHealth portal, you will also be able to view your health information using other applications (apps) compatible with our system.

## 2020-06-07 NOTE — ED PROVIDER NOTE - CARDIAC, MLM
Normal rate, regular rhythm.  Heart sounds S1, S2.  No murmurs, rubs or gallops. radial pulse intact

## 2020-06-07 NOTE — ED PROVIDER NOTE - PROGRESS NOTE DETAILS
Cruz: please use sling but move shoulder to prevent frozen shoulder.  can use bengay, motrin/tylenol. see ortho.    left shoulder pain possible rotator cuff injury.

## 2022-02-01 NOTE — ED PROVIDER NOTE - PROGRESS NOTE DETAILS
I interviewed patient briefly around 1724 on 2/18/2020. Orders were placed and patient was expected to be brought to main ED for formal interview and physical exam to better assess proper work-up and treatment. Patient was never examined. Informed the morning of 2/19/2020 that pt had eloped from waiting room last night at approx 1945 per nurse documentation and IV removed. I was not informed pt left at time of elopement. Pt to be dispo'd as elopement  Marycruz Daniel PA-C
Patient is not home bound, but require home physical therapy for impaired balance and decreased strength as well as home RN visits for HFrEF.

## 2023-01-18 NOTE — H&P PST ADULT - [facility] PE MLT D E PC
regular rate and rhythm/no murmur Valtrex Pregnancy And Lactation Text: this medication is Pregnancy Category B and is considered safe during pregnancy. This medication is not directly found in breast milk but it's metabolite acyclovir is present.

## 2023-08-25 NOTE — ED PROVIDER NOTE - NS ED ATTENDING STATEMENT MOD
normal/soft/nontender/nondistended/normal active bowel sounds I personally performed the services described in the documentation, reviewed the documentation recorded by the scribe in my presence and it accurately and completely records my words and action.

## 2025-04-15 NOTE — H&P PST ADULT - NS PRO FEM REPRO BREAST EXAM FREQ
Chief Complaint  Post-op Follow-up (Robotic left inguinal hernia repair/ /)    Subjective          Atilio Olson presents to [facility]  History of Present Illness    Atilio Olson is a 74 y.o. male  who presents today for a postoperative visit.     Patient is here for a follow-up after a robotic left inguinal hernia repair for a pantaloon type hernia.  He is doing okay.  He is still having some left testicular pain.    Past History:  Medical History: has a past medical history of Abnormal ECG ([id number]), Acid reflux, Arthritis, Arthritis of back, Arthritis of neck, Bladder disorder, BPH with urinary obstruction (05/06/2024), Cervical disc disorder, Claustrophobia, Colon polyp, Congestive heart failure (CHF), Deep vein thrombosis (2006), Diabetes, Elevated serum creatinine, Fracture, foot, High cholesterol, Hip arthrosis, Hypertension, Knee swelling, Limb swelling, Low back pain, Lumbosacral disc disease, Neurogenic bladder (01/18/2017), NSTEMI (non-ST elevated myocardial infarction) (02/02/2024), Periarthritis of shoulder, Peripheral neuropathy, Spinal stenosis, Stroke, Tear of meniscus of knee, and Tendonitis of shoulder (02/19/2018).   Surgical History: has a past surgical history that includes Colonoscopy; Excision; Gallbladder surgery; Hernia repair; Replacement total knee (Left); Replacement total knee (Right); Tonsillectomy; Wrist surgery; Lumbar discectomy (Left, 03/22/2023); Hand surgery (Bilateral); Shoulder surgery (Right); Foot surgery (Left); Cardiac catheterization (N/A, 02/02/2024); Cystoscopy; PROSTATE AQUABLATION (N/A, 05/06/2024); Cystoscopy (N/A, 10/18/2024); Epididymectomy (Right, 10/18/2024); and Inguinal Hernia Repair (Left, 4/2/2025).   Family History: family history includes Arthritis in his father and mother; Diabetes in his father, mother, sister, and son; Hearing loss in his father; Heart disease in his father and mother; Stroke in his father and  sister.   Social History: reports that he has been smoking cigarettes. He has a 15 pack-year smoking history. He has never used smokeless tobacco. He reports that he does not currently use alcohol. He reports that he does not use drugs.  Allergies: Patient has no known allergies.       Current Outpatient Medications:     aspirin 81 MG EC tablet, Take 1 tablet by mouth Daily. If urine clearing and not passing excessive clot, Disp: , Rfl:     atorvastatin (LIPITOR) 80 MG tablet, Take 1 tablet by mouth Every Night., Disp: , Rfl:     B Complex-C-Folic Acid (FOLBEE PLUS PO), Take 1 tablet by mouth Daily., Disp: , Rfl:     baclofen (LIORESAL) 10 MG tablet, Take 1 tablet by mouth 3 (Three) Times a Day As Needed for Muscle Spasms., Disp: , Rfl:     clopidogrel (PLAVIX) 75 MG tablet, Take 1 tablet by mouth Daily. If urine clearing and not passing excessive clot  Indications: Resume on 3/24/23, Disp: , Rfl:     gabapentin (NEURONTIN) 800 MG tablet, Take 1 tablet by mouth 3 (Three) Times a Day., Disp: , Rfl:     loratadine (CLARITIN) 10 MG tablet, Take 1 tablet by mouth Daily., Disp: , Rfl:     losartan-HCTZ (HYZAAR) 100-12.5 combo dose, Take 1 dose by mouth Every Evening., Disp: , Rfl:     metFORMIN (GLUCOPHAGE) 500 MG tablet, Take 1 tablet by mouth 2 (Two) Times a Day With Meals., Disp: , Rfl:     multivitamin with minerals tablet tablet, Take 1 tablet by mouth Daily., Disp: , Rfl:     pantoprazole (PROTONIX) 40 MG EC tablet, Take 1 tablet by mouth Daily., Disp: , Rfl:     temazepam (RESTORIL) 15 MG capsule, Take 1 capsule by mouth At Night As Needed for Sleep or Anxiety., Disp: , Rfl:     traMADol (ULTRAM) 50 MG tablet, Take 1 tablet by mouth Every 8 (Eight) Hours As Needed for Moderate Pain., Disp: , Rfl:     HYDROcodone-acetaminophen (NORCO) 5-325 MG per tablet, Take 1 tablet by mouth Every 6 (Six) Hours As Needed for Moderate Pain (Pain)., Disp: 10 tablet, Rfl: 0       Physical Exam  His incisions are healing up okay.   "He has a little bit of bruising around a couple of the port sites.  His hernia repair feels intact.  Objective     Vital Signs:   Ht 162.6 cm (64\")   Wt 76.1 kg (167 lb 12.8 oz)   BMI 28.80 kg/m²              Assessment and Plan    Diagnoses and all orders for this visit:    1. Left inguinal hernia (Primary)    I have told him that he could lift upwards of 30 pounds now and that it 2 more weeks he can be back to activity as tolerated.  I am going to see him back on an as-needed basis.      " monthly